# Patient Record
Sex: MALE | Race: WHITE
[De-identification: names, ages, dates, MRNs, and addresses within clinical notes are randomized per-mention and may not be internally consistent; named-entity substitution may affect disease eponyms.]

---

## 2017-01-11 PROBLEM — Z47.89 SURGICAL AFTERCARE, MUSCULOSKELETAL SYSTEM: Status: ACTIVE | Noted: 2017-01-11

## 2017-05-11 ENCOUNTER — PRIOR ORIGINAL RECORDS (OUTPATIENT)
Dept: OTHER | Age: 67
End: 2017-05-11

## 2017-05-16 PROBLEM — M23.91 INTERNAL DERANGEMENT OF KNEE, RIGHT: Status: ACTIVE | Noted: 2017-05-16

## 2017-05-16 PROBLEM — N40.1 BPH WITH OBSTRUCTION/LOWER URINARY TRACT SYMPTOMS: Status: ACTIVE | Noted: 2017-05-16

## 2017-05-16 PROBLEM — N13.8 BPH WITH OBSTRUCTION/LOWER URINARY TRACT SYMPTOMS: Status: ACTIVE | Noted: 2017-05-16

## 2017-06-06 ENCOUNTER — PRIOR ORIGINAL RECORDS (OUTPATIENT)
Dept: OTHER | Age: 67
End: 2017-06-06

## 2017-06-12 LAB
BUN: 17 MG/DL
CALCIUM: 9 MG/DL
CHLORIDE: 104 MEQ/L
CREATININE, SERUM: 1.21 MG/DL
GLUCOSE: 89 MG/DL
HEMATOCRIT: 47.5 %
HEMOGLOBIN: 15.7 G/DL
MCH: 28.6 PG
MCHC: 33.1 G/DL
MCV: 86.5 FL
PLATELETS: 201 K/UL
POTASSIUM, SERUM: 5.1 MEQ/L
RED BLOOD COUNT: 5.49 X 10-6/U
SODIUM: 139 MEQ/L
WHITE BLOOD COUNT: 9.2 X 10-3/U

## 2017-06-13 ENCOUNTER — PRIOR ORIGINAL RECORDS (OUTPATIENT)
Dept: OTHER | Age: 67
End: 2017-06-13

## 2017-06-15 ENCOUNTER — PRIOR ORIGINAL RECORDS (OUTPATIENT)
Dept: OTHER | Age: 67
End: 2017-06-15

## 2017-06-16 ENCOUNTER — HOSPITAL ENCOUNTER (OUTPATIENT)
Dept: NUCLEAR MEDICINE | Facility: HOSPITAL | Age: 67
Discharge: HOME | End: 2017-06-16
Attending: INTERNAL MEDICINE
Payer: COMMERCIAL

## 2017-06-16 ENCOUNTER — HOSPITAL ENCOUNTER (OUTPATIENT)
Dept: CV DIAGNOSTICS | Facility: HOSPITAL | Age: 67
Discharge: HOME | End: 2017-06-16
Attending: INTERNAL MEDICINE
Payer: COMMERCIAL

## 2017-06-16 DIAGNOSIS — I25.119 CORONARY ARTERY DISEASE WITH ANGINA PECTORIS, UNSPECIFIED VESSEL OR LESION TYPE, UNSPECIFIED WHETHER NATIVE OR TRANSPLANTED HEART (HCC): ICD-10-CM

## 2017-06-16 DIAGNOSIS — Z01.818 PRE-OP TESTING: ICD-10-CM

## 2017-06-16 PROCEDURE — 93018 CV STRESS TEST I&R ONLY: CPT | Performed by: INTERNAL MEDICINE

## 2017-06-16 PROCEDURE — 93017 CV STRESS TEST TRACING ONLY: CPT | Performed by: INTERNAL MEDICINE

## 2017-06-16 PROCEDURE — 93016 CV STRESS TEST SUPVJ ONLY: CPT | Performed by: INTERNAL MEDICINE

## 2017-06-16 PROCEDURE — 78452 HT MUSCLE IMAGE SPECT MULT: CPT | Performed by: INTERNAL MEDICINE

## 2017-06-16 RX ORDER — 0.9 % SODIUM CHLORIDE 0.9 %
VIAL (ML) INJECTION
Status: COMPLETED
Start: 2017-06-16 | End: 2017-06-16

## 2017-06-16 RX ADMIN — 0.9 % SODIUM CHLORIDE 10 ML: 0.9 % VIAL (ML) INJECTION at 11:55:00

## 2017-06-21 ENCOUNTER — PRIOR ORIGINAL RECORDS (OUTPATIENT)
Dept: OTHER | Age: 67
End: 2017-06-21

## 2017-06-21 ENCOUNTER — HOSPITAL ENCOUNTER (OUTPATIENT)
Dept: GENERAL RADIOLOGY | Facility: HOSPITAL | Age: 67
Discharge: HOME | End: 2017-06-21
Attending: INTERNAL MEDICINE
Payer: COMMERCIAL

## 2017-06-21 DIAGNOSIS — R94.39 ABNORMAL STRESS ECHO: ICD-10-CM

## 2017-06-21 DIAGNOSIS — Z01.810 PRE-OPERATIVE CARDIOVASCULAR EXAMINATION: ICD-10-CM

## 2017-06-21 PROCEDURE — 71020 XR CHEST PA + LAT CHEST (CPT=71020): CPT | Performed by: INTERNAL MEDICINE

## 2017-06-21 RX ORDER — SODIUM CHLORIDE 9 MG/ML
INJECTION, SOLUTION INTRAVENOUS ONCE
Status: COMPLETED | OUTPATIENT
Start: 2017-06-22 | End: 2017-06-22

## 2017-06-22 ENCOUNTER — HOSPITAL ENCOUNTER (OUTPATIENT)
Dept: INTERVENTIONAL RADIOLOGY/VASCULAR | Facility: HOSPITAL | Age: 67
Discharge: HOME | End: 2017-06-22
Attending: INTERNAL MEDICINE | Admitting: INTERNAL MEDICINE
Payer: COMMERCIAL

## 2017-06-22 ENCOUNTER — PRIOR ORIGINAL RECORDS (OUTPATIENT)
Dept: OTHER | Age: 67
End: 2017-06-22

## 2017-06-22 VITALS
HEIGHT: 69.5 IN | OXYGEN SATURATION: 97 % | BODY MASS INDEX: 26.06 KG/M2 | HEART RATE: 56 BPM | DIASTOLIC BLOOD PRESSURE: 68 MMHG | SYSTOLIC BLOOD PRESSURE: 129 MMHG | WEIGHT: 180 LBS | RESPIRATION RATE: 16 BRPM

## 2017-06-22 DIAGNOSIS — R94.39 ABNORMAL STRESS TEST: ICD-10-CM

## 2017-06-22 DIAGNOSIS — I25.10 CAD (CORONARY ARTERY DISEASE): ICD-10-CM

## 2017-06-22 PROCEDURE — B2151ZZ FLUOROSCOPY OF LEFT HEART USING LOW OSMOLAR CONTRAST: ICD-10-PCS | Performed by: INTERNAL MEDICINE

## 2017-06-22 PROCEDURE — 93459 L HRT ART/GRFT ANGIO: CPT

## 2017-06-22 PROCEDURE — 4A023N7 MEASUREMENT OF CARDIAC SAMPLING AND PRESSURE, LEFT HEART, PERCUTANEOUS APPROACH: ICD-10-PCS | Performed by: INTERNAL MEDICINE

## 2017-06-22 PROCEDURE — 99152 MOD SED SAME PHYS/QHP 5/>YRS: CPT

## 2017-06-22 PROCEDURE — 99153 MOD SED SAME PHYS/QHP EA: CPT

## 2017-06-22 PROCEDURE — B2131ZZ FLUOROSCOPY OF MULTIPLE CORONARY ARTERY BYPASS GRAFTS USING LOW OSMOLAR CONTRAST: ICD-10-PCS | Performed by: INTERNAL MEDICINE

## 2017-06-22 RX ORDER — SODIUM CHLORIDE 9 MG/ML
INJECTION, SOLUTION INTRAVENOUS
Status: COMPLETED
Start: 2017-06-22 | End: 2017-06-22

## 2017-06-22 RX ORDER — LIDOCAINE HYDROCHLORIDE 20 MG/ML
INJECTION, SOLUTION EPIDURAL; INFILTRATION; INTRACAUDAL; PERINEURAL
Status: COMPLETED
Start: 2017-06-22 | End: 2017-06-22

## 2017-06-22 RX ORDER — SODIUM CHLORIDE 9 MG/ML
INJECTION, SOLUTION INTRAVENOUS CONTINUOUS
Status: DISCONTINUED | OUTPATIENT
Start: 2017-06-22 | End: 2017-06-22

## 2017-06-22 RX ORDER — MIDAZOLAM HYDROCHLORIDE 1 MG/ML
INJECTION INTRAMUSCULAR; INTRAVENOUS
Status: COMPLETED
Start: 2017-06-22 | End: 2017-06-22

## 2017-06-22 RX ADMIN — SODIUM CHLORIDE: 9 INJECTION, SOLUTION INTRAVENOUS at 10:29:00

## 2017-06-22 NOTE — PROGRESS NOTES
[facility] HOSP - [facility]    Brief Cardiac Cath Note  Stephaniedanna Paulson Patient Status:  Outpatient    1950 MRN [id number]   Location [facility] Attending Alberta Francois MD   Hosp Day # 0 PCP Caitlin Haque,

## 2017-06-22 NOTE — PROCEDURES
[facility]    PATIENT'S NAME: Timi Juan José   ATTENDING PHYSICIAN: Sruthi Kelley. Amira Saldana MD   OPERATING PHYSICIAN: Gerald Zaman.  Thu Ward MD   PATIENT ACCOUNT#:   [id number]    LOCATION:  [address]  MEDICAL RECORD #:   [id number]       DA above with some degeneration in the ramus graft. The stent at the distal portion of the ramus graft into the ramus artery itself is widely patent. 2.   Normal left ventricular contractility.   3.   Normal left ventricular function with mildly elevated lef

## 2017-06-22 NOTE — PLAN OF CARE
Pt ambulated/voided/took liquids and food post procedure and tolerated well Site soft dry and intact after ambulation

## 2017-07-05 ENCOUNTER — PRIOR ORIGINAL RECORDS (OUTPATIENT)
Dept: OTHER | Age: 67
End: 2017-07-05

## 2017-07-05 ENCOUNTER — MYAURORA ACCOUNT LINK (OUTPATIENT)
Dept: OTHER | Age: 67
End: 2017-07-05

## 2017-08-11 PROCEDURE — 84153 ASSAY OF PSA TOTAL: CPT | Performed by: UROLOGY

## 2017-08-16 ENCOUNTER — PRIOR ORIGINAL RECORDS (OUTPATIENT)
Dept: OTHER | Age: 67
End: 2017-08-16

## 2017-08-21 PROBLEM — S83.241D TEAR OF MEDIAL MENISCUS OF RIGHT KNEE, UNSPECIFIED TEAR TYPE, UNSPECIFIED WHETHER OLD OR CURRENT TEAR, SUBSEQUENT ENCOUNTER: Status: ACTIVE | Noted: 2017-08-21

## 2017-11-16 ENCOUNTER — PRIOR ORIGINAL RECORDS (OUTPATIENT)
Dept: OTHER | Age: 67
End: 2017-11-16

## 2017-12-11 LAB
CHOLESTEROL, TOTAL: 165 MG/DL
HDL CHOLESTEROL: 43 MG/DL
LDL CHOLESTEROL: 90 MG/DL
TRIGLYCERIDES: 159 MG/DL

## 2017-12-12 ENCOUNTER — PRIOR ORIGINAL RECORDS (OUTPATIENT)
Dept: OTHER | Age: 67
End: 2017-12-12

## 2017-12-20 LAB
THYROID STIMULATING HORMONE: 2.03 MLU/L
URIC ACID: 7.7 MG/DL
VITAMIN D 25-OH: 23.93 NG/ML

## 2018-01-04 ENCOUNTER — LAB ENCOUNTER (OUTPATIENT)
Dept: LAB | Age: 68
End: 2018-01-04
Attending: INTERNAL MEDICINE
Payer: COMMERCIAL

## 2018-01-04 ENCOUNTER — PRIOR ORIGINAL RECORDS (OUTPATIENT)
Dept: OTHER | Age: 68
End: 2018-01-04

## 2018-01-04 DIAGNOSIS — E79.0 URICACIDEMIA: Primary | ICD-10-CM

## 2018-01-04 LAB
ALT SERPL-CCNC: 41 U/L (ref 17–63)
AST SERPL-CCNC: 21 U/L (ref 15–41)

## 2018-01-04 PROCEDURE — 84460 ALANINE AMINO (ALT) (SGPT): CPT

## 2018-01-04 PROCEDURE — 84450 TRANSFERASE (AST) (SGOT): CPT

## 2018-01-05 LAB
ALT (SGPT): 41 U/L
AST (SGOT): 21 U/L

## 2018-02-27 ENCOUNTER — LAB ENCOUNTER (OUTPATIENT)
Dept: LAB | Age: 68
End: 2018-02-27
Attending: FAMILY MEDICINE
Payer: COMMERCIAL

## 2018-02-27 DIAGNOSIS — I25.10 CORONARY ATHEROSCLEROSIS OF NATIVE CORONARY ARTERY: Primary | ICD-10-CM

## 2018-02-27 LAB
ALT SERPL-CCNC: 34 U/L (ref 17–63)
AST SERPL-CCNC: 22 U/L (ref 15–41)
CHOLEST SMN-MCNC: 126 MG/DL (ref ?–200)
HDLC SERPL-MCNC: 40 MG/DL (ref 45–?)
HDLC SERPL: 3.15 {RATIO} (ref ?–4.97)
LDLC SERPL CALC-MCNC: 62 MG/DL (ref ?–130)
NONHDLC SERPL-MCNC: 86 MG/DL (ref ?–130)
TRIGL SERPL-MCNC: 119 MG/DL (ref ?–150)
VLDLC SERPL CALC-MCNC: 24 MG/DL (ref 5–40)

## 2018-02-27 PROCEDURE — 84460 ALANINE AMINO (ALT) (SGPT): CPT

## 2018-02-27 PROCEDURE — 84450 TRANSFERASE (AST) (SGOT): CPT

## 2018-02-27 PROCEDURE — 80061 LIPID PANEL: CPT

## 2018-06-19 ENCOUNTER — PRIOR ORIGINAL RECORDS (OUTPATIENT)
Dept: OTHER | Age: 68
End: 2018-06-19

## 2018-06-19 ENCOUNTER — MYAURORA ACCOUNT LINK (OUTPATIENT)
Dept: OTHER | Age: 68
End: 2018-06-19

## 2018-06-20 ENCOUNTER — PRIOR ORIGINAL RECORDS (OUTPATIENT)
Dept: OTHER | Age: 68
End: 2018-06-20

## 2018-06-27 ENCOUNTER — PRIOR ORIGINAL RECORDS (OUTPATIENT)
Dept: OTHER | Age: 68
End: 2018-06-27

## 2018-06-27 ENCOUNTER — LAB ENCOUNTER (OUTPATIENT)
Dept: LAB | Age: 68
End: 2018-06-27
Attending: INTERNAL MEDICINE
Payer: COMMERCIAL

## 2018-06-27 DIAGNOSIS — E78.00 PURE HYPERCHOLESTEROLEMIA: Primary | ICD-10-CM

## 2018-06-27 PROCEDURE — 84460 ALANINE AMINO (ALT) (SGPT): CPT

## 2018-06-27 PROCEDURE — 84450 TRANSFERASE (AST) (SGOT): CPT

## 2018-06-27 PROCEDURE — 80061 LIPID PANEL: CPT

## 2018-06-28 LAB
ALT (SGPT): 34 U/L
AST (SGOT): 18 U/L
CHOLESTEROL, TOTAL: 118 MG/DL
HDL CHOLESTEROL: 39 MG/DL
LDL CHOLESTEROL: 59 MG/DL
TRIGLYCERIDES: 118 MG/DL

## 2018-07-03 ENCOUNTER — MYAURORA ACCOUNT LINK (OUTPATIENT)
Dept: OTHER | Age: 68
End: 2018-07-03

## 2018-07-03 ENCOUNTER — PRIOR ORIGINAL RECORDS (OUTPATIENT)
Dept: OTHER | Age: 68
End: 2018-07-03

## 2018-08-16 PROCEDURE — 36415 COLL VENOUS BLD VENIPUNCTURE: CPT | Performed by: UROLOGY

## 2018-08-16 PROCEDURE — 84153 ASSAY OF PSA TOTAL: CPT | Performed by: UROLOGY

## 2018-08-16 PROCEDURE — 84154 ASSAY OF PSA FREE: CPT | Performed by: UROLOGY

## 2018-10-09 PROCEDURE — 86038 ANTINUCLEAR ANTIBODIES: CPT | Performed by: FAMILY MEDICINE

## 2018-10-09 PROCEDURE — 86200 CCP ANTIBODY: CPT | Performed by: FAMILY MEDICINE

## 2018-11-14 PROBLEM — Z79.02 PLATELET INHIBITION DUE TO PLAVIX: Status: ACTIVE | Noted: 2018-11-14

## 2018-11-21 PROBLEM — Z86.010 HISTORY OF ADENOMATOUS POLYP OF COLON: Status: ACTIVE | Noted: 2018-11-21

## 2018-11-21 PROBLEM — Z86.0101 HISTORY OF ADENOMATOUS POLYP OF COLON: Status: ACTIVE | Noted: 2018-11-21

## 2018-11-21 PROCEDURE — 88305 TISSUE EXAM BY PATHOLOGIST: CPT | Performed by: INTERNAL MEDICINE

## 2019-02-28 VITALS
BODY MASS INDEX: 27.7 KG/M2 | HEIGHT: 69 IN | RESPIRATION RATE: 18 BRPM | DIASTOLIC BLOOD PRESSURE: 70 MMHG | SYSTOLIC BLOOD PRESSURE: 120 MMHG | HEART RATE: 60 BPM | WEIGHT: 187 LBS

## 2019-02-28 VITALS
RESPIRATION RATE: 10 BRPM | SYSTOLIC BLOOD PRESSURE: 132 MMHG | DIASTOLIC BLOOD PRESSURE: 66 MMHG | HEART RATE: 57 BPM | OXYGEN SATURATION: 98 % | HEIGHT: 69 IN | BODY MASS INDEX: 26.96 KG/M2 | WEIGHT: 182 LBS

## 2019-02-28 VITALS
OXYGEN SATURATION: 97 % | SYSTOLIC BLOOD PRESSURE: 128 MMHG | WEIGHT: 181 LBS | HEART RATE: 49 BPM | BODY MASS INDEX: 26.81 KG/M2 | HEIGHT: 69 IN | DIASTOLIC BLOOD PRESSURE: 82 MMHG | RESPIRATION RATE: 20 BRPM

## 2019-02-28 VITALS
SYSTOLIC BLOOD PRESSURE: 122 MMHG | HEART RATE: 54 BPM | WEIGHT: 176 LBS | RESPIRATION RATE: 20 BRPM | DIASTOLIC BLOOD PRESSURE: 72 MMHG | HEIGHT: 69 IN | OXYGEN SATURATION: 97 % | BODY MASS INDEX: 26.07 KG/M2

## 2019-03-01 VITALS
SYSTOLIC BLOOD PRESSURE: 138 MMHG | HEART RATE: 61 BPM | WEIGHT: 182 LBS | BODY MASS INDEX: 26.96 KG/M2 | DIASTOLIC BLOOD PRESSURE: 68 MMHG | HEIGHT: 69 IN | RESPIRATION RATE: 18 BRPM

## 2019-03-12 RX ORDER — LORATADINE 10 MG/1
TABLET ORAL
COMMUNITY

## 2019-03-12 RX ORDER — CLOPIDOGREL BISULFATE 75 MG/1
1 TABLET ORAL DAILY
COMMUNITY
Start: 2018-10-04 | End: 2019-04-04 | Stop reason: SDUPTHER

## 2019-03-12 RX ORDER — ESCITALOPRAM OXALATE 20 MG/1
TABLET ORAL DAILY
COMMUNITY
End: 2020-09-30

## 2019-03-12 RX ORDER — ATORVASTATIN CALCIUM 40 MG/1
TABLET, FILM COATED ORAL
COMMUNITY
Start: 2018-08-16 | End: 2019-04-04 | Stop reason: SDUPTHER

## 2019-03-12 RX ORDER — RAMIPRIL 10 MG/1
CAPSULE ORAL
COMMUNITY

## 2019-03-12 RX ORDER — METOPROLOL SUCCINATE 25 MG/1
6.25 TABLET, EXTENDED RELEASE ORAL
COMMUNITY
Start: 2018-06-20 | End: 2021-05-04 | Stop reason: SDUPTHER

## 2019-03-12 RX ORDER — PHENOL 1.4 %
1 AEROSOL, SPRAY (ML) MUCOUS MEMBRANE DAILY
COMMUNITY
Start: 2016-12-06

## 2019-03-25 ENCOUNTER — OFFICE VISIT (OUTPATIENT)
Dept: CARDIOLOGY | Age: 69
End: 2019-03-25

## 2019-03-25 ENCOUNTER — APPOINTMENT (OUTPATIENT)
Dept: CARDIOLOGY | Age: 69
End: 2019-03-25

## 2019-03-25 VITALS
DIASTOLIC BLOOD PRESSURE: 60 MMHG | WEIGHT: 184 LBS | HEART RATE: 62 BPM | SYSTOLIC BLOOD PRESSURE: 126 MMHG | BODY MASS INDEX: 27.25 KG/M2 | HEIGHT: 69 IN

## 2019-03-25 DIAGNOSIS — E78.5 HYPERLIPIDEMIA, UNSPECIFIED HYPERLIPIDEMIA TYPE: ICD-10-CM

## 2019-03-25 DIAGNOSIS — R00.1 BRADYCARDIA: ICD-10-CM

## 2019-03-25 DIAGNOSIS — I25.10 CORONARY ARTERY DISEASE WITHOUT ANGINA PECTORIS, UNSPECIFIED VESSEL OR LESION TYPE, UNSPECIFIED WHETHER NATIVE OR TRANSPLANTED HEART: Primary | ICD-10-CM

## 2019-03-25 DIAGNOSIS — I10 HYPERTENSION, UNSPECIFIED TYPE: ICD-10-CM

## 2019-03-25 PROCEDURE — 99214 OFFICE O/P EST MOD 30 MIN: CPT | Performed by: INTERNAL MEDICINE

## 2019-03-25 PROCEDURE — 3078F DIAST BP <80 MM HG: CPT | Performed by: INTERNAL MEDICINE

## 2019-03-25 PROCEDURE — 3074F SYST BP LT 130 MM HG: CPT | Performed by: INTERNAL MEDICINE

## 2019-04-04 RX ORDER — ATORVASTATIN CALCIUM 40 MG/1
TABLET, FILM COATED ORAL
Qty: 90 TABLET | Refills: 2 | Status: SHIPPED | OUTPATIENT
Start: 2019-04-04 | End: 2020-01-08 | Stop reason: ALTCHOICE

## 2019-04-04 RX ORDER — CLOPIDOGREL BISULFATE 75 MG/1
TABLET ORAL
Qty: 90 TABLET | Refills: 2 | Status: SHIPPED | OUTPATIENT
Start: 2019-04-04 | End: 2020-01-06 | Stop reason: SDUPTHER

## 2019-04-04 RX ORDER — METOPROLOL SUCCINATE 25 MG/1
TABLET, EXTENDED RELEASE ORAL
Status: CANCELLED | OUTPATIENT
Start: 2019-04-04

## 2019-10-13 PROBLEM — R39.15 URGENCY OF MICTURITION: Status: ACTIVE | Noted: 2019-10-13

## 2019-10-13 PROBLEM — R35.1 NOCTURIA: Status: ACTIVE | Noted: 2019-10-13

## 2019-10-24 RX ORDER — ATORVASTATIN CALCIUM 40 MG/1
TABLET, FILM COATED ORAL
Qty: 90 TABLET | Refills: 2 | OUTPATIENT
Start: 2019-10-24

## 2019-10-24 RX ORDER — CLOPIDOGREL BISULFATE 75 MG/1
TABLET ORAL
Qty: 90 TABLET | Refills: 2 | OUTPATIENT
Start: 2019-10-24

## 2019-11-01 ENCOUNTER — TELEPHONE (OUTPATIENT)
Dept: CARDIOLOGY | Age: 69
End: 2019-11-01

## 2019-11-01 RX ORDER — ATORVASTATIN CALCIUM 40 MG/1
TABLET, FILM COATED ORAL
Qty: 90 TABLET | Refills: 2 | Status: CANCELLED | OUTPATIENT
Start: 2019-11-01

## 2019-11-01 RX ORDER — CLOPIDOGREL BISULFATE 75 MG/1
TABLET ORAL
Qty: 90 TABLET | Refills: 2 | Status: CANCELLED | OUTPATIENT
Start: 2019-11-01

## 2019-11-01 RX ORDER — CLOPIDOGREL BISULFATE 75 MG/1
75 TABLET ORAL DAILY
Qty: 90 TABLET | Refills: 0 | Status: SHIPPED | OUTPATIENT
Start: 2019-11-01 | End: 2020-02-24 | Stop reason: SDUPTHER

## 2019-11-12 RX ORDER — ATORVASTATIN CALCIUM 40 MG/1
TABLET, FILM COATED ORAL
Qty: 90 TABLET | Refills: 2 | OUTPATIENT
Start: 2019-11-12

## 2019-11-26 LAB
ABSOLUTE IMMATURE GRANULOCYTES (OFFPRE24): NORMAL
ALT SERPL-CCNC: 38 U/L
ANION GAP SERPL CALC-SCNC: NORMAL MMOL/L
BASO+EOS+MONOS # BLD: NORMAL 10*3/UL
BASO+EOS+MONOS NFR BLD: NORMAL %
BASOPHILS # BLD: NORMAL 10*3/UL
BASOPHILS NFR BLD: NORMAL %
BUN SERPL-MCNC: 15 MG/DL
BUN/CREAT SERPL: NORMAL
CALCIUM SERPL-MCNC: 8.3 MG/DL
CHLORIDE SERPL-SCNC: 107 MMOL/L
CHOLEST SERPL-MCNC: 128 MG/DL
CHOLEST/HDLC SERPL: NORMAL {RATIO}
CO2 SERPL-SCNC: 26.1 MMOL/L
CREAT SERPL-MCNC: 0.94 MG/DL
DIFFERENTIAL METHOD BLD: NORMAL
EOSINOPHIL # BLD: NORMAL 10*3/UL
EOSINOPHIL NFR BLD: NORMAL %
ERYTHROCYTE [DISTWIDTH] IN BLOOD: NORMAL %
GLUCOSE SERPL-MCNC: 93 MG/DL
HCT VFR BLD CALC: 45.1 %
HDLC SERPL-MCNC: 40 MG/DL
HGB BLD-MCNC: 14.7 G/DL
IMMATURE GRANULOCYTES (OFFPRE25): NORMAL
LDLC SERPL CALC-MCNC: 69 MG/DL
LENGTH OF FAST TIME PATIENT: NORMAL H
LENGTH OF FAST TIME PATIENT: NORMAL H
LYMPHOCYTES # BLD: NORMAL 10*3/UL
LYMPHOCYTES NFR BLD: NORMAL %
MCH RBC QN AUTO: NORMAL PG
MCHC RBC AUTO-ENTMCNC: NORMAL G/DL
MCV RBC AUTO: NORMAL FL
MONOCYTES # BLD: NORMAL 10*3/UL
MONOCYTES NFR BLD: NORMAL %
MPV (OFFPRE2): NORMAL
NEUTROPHILS # BLD: NORMAL 10*3/UL
NEUTROPHILS NFR BLD: NORMAL %
NONHDLC SERPL-MCNC: NORMAL MG/DL
NRBC BLD MANUAL-RTO: NORMAL %
PLAT MORPH BLD: NORMAL
PLATELET # BLD: 206 10*3/UL
POTASSIUM SERPL-SCNC: 4.2 MMOL/L
RBC # BLD: 5.19 10*6/UL
RBC MORPH BLD: NORMAL
SODIUM SERPL-SCNC: 140 MMOL/L
TRIGL SERPL-MCNC: 96 MG/DL
TSH SERPL-ACNC: 1.69 M[IU]/L
VLDLC SERPL CALC-MCNC: NORMAL MG/DL
WBC # BLD: 7.94 10*3/UL
WBC MORPH BLD: NORMAL

## 2020-01-01 ENCOUNTER — EXTERNAL RECORD (OUTPATIENT)
Dept: HEALTH INFORMATION MANAGEMENT | Facility: OTHER | Age: 70
End: 2020-01-01

## 2020-01-06 RX ORDER — TAMSULOSIN HYDROCHLORIDE 0.4 MG/1
CAPSULE ORAL
COMMUNITY
Start: 2019-12-13

## 2020-01-06 RX ORDER — VALACYCLOVIR HYDROCHLORIDE 1 G/1
TABLET, FILM COATED ORAL
COMMUNITY
Start: 2019-12-18

## 2020-01-08 ENCOUNTER — OFFICE VISIT (OUTPATIENT)
Dept: CARDIOLOGY | Age: 70
End: 2020-01-08

## 2020-01-08 VITALS
OXYGEN SATURATION: 99 % | SYSTOLIC BLOOD PRESSURE: 142 MMHG | WEIGHT: 180 LBS | BODY MASS INDEX: 26.66 KG/M2 | HEART RATE: 56 BPM | RESPIRATION RATE: 18 BRPM | HEIGHT: 69 IN | DIASTOLIC BLOOD PRESSURE: 80 MMHG

## 2020-01-08 DIAGNOSIS — I25.10 CORONARY ARTERY DISEASE WITHOUT ANGINA PECTORIS, UNSPECIFIED VESSEL OR LESION TYPE, UNSPECIFIED WHETHER NATIVE OR TRANSPLANTED HEART: Primary | ICD-10-CM

## 2020-01-08 DIAGNOSIS — E78.00 PURE HYPERCHOLESTEROLEMIA: ICD-10-CM

## 2020-01-08 PROCEDURE — 3079F DIAST BP 80-89 MM HG: CPT | Performed by: INTERNAL MEDICINE

## 2020-01-08 PROCEDURE — 99214 OFFICE O/P EST MOD 30 MIN: CPT | Performed by: INTERNAL MEDICINE

## 2020-01-08 PROCEDURE — 3077F SYST BP >= 140 MM HG: CPT | Performed by: INTERNAL MEDICINE

## 2020-01-08 RX ORDER — ROSUVASTATIN CALCIUM 20 MG/1
20 TABLET, COATED ORAL DAILY
Qty: 90 TABLET | Refills: 3 | Status: SHIPPED | OUTPATIENT
Start: 2020-01-08 | End: 2020-06-17 | Stop reason: ALTCHOICE

## 2020-01-08 ASSESSMENT — PATIENT HEALTH QUESTIONNAIRE - PHQ9
1. LITTLE INTEREST OR PLEASURE IN DOING THINGS: NOT AT ALL
SUM OF ALL RESPONSES TO PHQ9 QUESTIONS 1 AND 2: 0
SUM OF ALL RESPONSES TO PHQ9 QUESTIONS 1 AND 2: 0
2. FEELING DOWN, DEPRESSED OR HOPELESS: NOT AT ALL

## 2020-01-14 ENCOUNTER — ANCILLARY PROCEDURE (OUTPATIENT)
Dept: CARDIOLOGY | Age: 70
End: 2020-01-14
Attending: INTERNAL MEDICINE

## 2020-01-14 VITALS — HEIGHT: 69 IN | BODY MASS INDEX: 26.66 KG/M2 | WEIGHT: 180 LBS

## 2020-01-14 DIAGNOSIS — R06.02 SHORTNESS OF BREATH: Primary | ICD-10-CM

## 2020-01-14 DIAGNOSIS — I25.10 CORONARY ARTERY DISEASE WITHOUT ANGINA PECTORIS, UNSPECIFIED VESSEL OR LESION TYPE, UNSPECIFIED WHETHER NATIVE OR TRANSPLANTED HEART: ICD-10-CM

## 2020-01-14 LAB
HEART RATE RESERVE PREDICTED: 47.02 BPM
LV EF: 66 %
PEAK HR ACHIEVED: 80 BPM
RESTING HR ACHIEVED: 48 BPM
STRESS BASELINE BP: NORMAL MMHG
STRESS PERCENT HR: 53 %
STRESS POST EXERCISE DUR MIN: 4 MIN
STRESS POST EXERCISE DUR SEC: 0 SEC
STRESS POST PEAK BP: NORMAL MMHG
STRESS TARGET HR: 151 BPM

## 2020-01-14 PROCEDURE — 78452 HT MUSCLE IMAGE SPECT MULT: CPT | Performed by: INTERNAL MEDICINE

## 2020-01-14 PROCEDURE — A9502 TC99M TETROFOSMIN: HCPCS

## 2020-01-14 PROCEDURE — 93015 CV STRESS TEST SUPVJ I&R: CPT | Performed by: INTERNAL MEDICINE

## 2020-01-14 RX ORDER — REGADENOSON 0.08 MG/ML
0.4 INJECTION, SOLUTION INTRAVENOUS ONCE
Status: COMPLETED | OUTPATIENT
Start: 2020-01-14 | End: 2020-01-14

## 2020-01-14 RX ADMIN — REGADENOSON 0.4 MG: 0.08 INJECTION, SOLUTION INTRAVENOUS at 09:10

## 2020-01-14 ASSESSMENT — EXERCISE STRESS TEST
GRADE: 0
MPH: 1.0
STRESS_SYMPTOMS: LIGHTHEADEDNESS
PEAK_BP: 126/74
GRADE: 0
PEAK_RPP: 6720
STAGE_CATEGORIES: RECOVERY 2
PEAK_HR: 60
STAGE_CATEGORIES: RECOVERY 1
PEAK_BP: 142/82
STAGE_CATEGORIES: 2
PEAK_BP: 122/70
PEAK_HR: 80
PEAK_HR: 48
MPH: 1.0
STAGE_CATEGORIES: RECOVERY 0
PEAK_BP: 128/70
PEAK_BP: 140/82
PEAK_BP: 136/84
COMMENTS: 4 MINUTE RECOVERY
PEAK_HR: 58
STAGE_CATEGORIES: 1
PEAK_RPP: 8236
PEAK_RPP: 9600
PEAK_HR: 75
PEAK_HR: 61
PEAK_RPP: 7442
PEAK_RPP: 10080
GRADE: 0
STAGE_CATEGORIES: RESTING
MPH: 1.0
PEAK_RPP: 8160
STOPPAGE_REASON: PROTOCOL COMPLETE

## 2020-01-15 ENCOUNTER — TELEPHONE (OUTPATIENT)
Dept: CARDIOLOGY | Age: 70
End: 2020-01-15

## 2020-01-16 ENCOUNTER — TELEPHONE (OUTPATIENT)
Dept: CARDIOLOGY | Age: 70
End: 2020-01-16

## 2020-02-24 RX ORDER — CLOPIDOGREL BISULFATE 75 MG/1
TABLET ORAL
Qty: 90 TABLET | Refills: 1 | Status: SHIPPED | OUTPATIENT
Start: 2020-02-24 | End: 2020-06-05

## 2020-06-05 RX ORDER — CLOPIDOGREL BISULFATE 75 MG/1
TABLET ORAL
Qty: 90 TABLET | Refills: 0 | Status: SHIPPED | OUTPATIENT
Start: 2020-06-05 | End: 2021-05-04 | Stop reason: SDUPTHER

## 2020-06-12 LAB
ALT SERPL-CCNC: 20 U/L
AST SERPL-CCNC: 19 U/L
CHOLEST SERPL-MCNC: 158 MG/DL
HDLC SERPL-MCNC: 38 MG/DL
LDLC SERPL CALC-MCNC: 82 MG/DL
TRIGL SERPL-MCNC: 191 MG/DL

## 2020-06-15 ENCOUNTER — CLINICAL ABSTRACT (OUTPATIENT)
Dept: CARDIOLOGY | Age: 70
End: 2020-06-15

## 2020-06-16 ENCOUNTER — CLINICAL ABSTRACT (OUTPATIENT)
Dept: CARDIOLOGY | Age: 70
End: 2020-06-16

## 2020-06-16 ENCOUNTER — TELEPHONE (OUTPATIENT)
Dept: CARDIOLOGY | Age: 70
End: 2020-06-16

## 2020-06-16 DIAGNOSIS — E78.5 HYPERLIPIDEMIA, UNSPECIFIED HYPERLIPIDEMIA TYPE: Primary | ICD-10-CM

## 2020-06-17 RX ORDER — ATORVASTATIN CALCIUM 40 MG/1
TABLET, FILM COATED ORAL
Status: SHIPPED | COMMUNITY
Start: 2020-06-17 | End: 2020-07-23 | Stop reason: SDUPTHER

## 2020-07-23 ENCOUNTER — TELEPHONE (OUTPATIENT)
Dept: CARDIOLOGY | Age: 70
End: 2020-07-23

## 2020-07-23 RX ORDER — ATORVASTATIN CALCIUM 40 MG/1
TABLET, FILM COATED ORAL
Qty: 135 TABLET | Refills: 0 | Status: SHIPPED | OUTPATIENT
Start: 2020-07-23 | End: 2020-10-05

## 2020-08-17 ENCOUNTER — APPOINTMENT (OUTPATIENT)
Dept: CARDIOLOGY | Age: 70
End: 2020-08-17

## 2020-09-30 ENCOUNTER — OFFICE VISIT (OUTPATIENT)
Dept: CARDIOLOGY | Age: 70
End: 2020-09-30

## 2020-09-30 VITALS
WEIGHT: 184 LBS | RESPIRATION RATE: 18 BRPM | BODY MASS INDEX: 26.34 KG/M2 | OXYGEN SATURATION: 99 % | SYSTOLIC BLOOD PRESSURE: 122 MMHG | HEIGHT: 70 IN | HEART RATE: 57 BPM | DIASTOLIC BLOOD PRESSURE: 80 MMHG

## 2020-09-30 DIAGNOSIS — E78.5 HYPERLIPIDEMIA, UNSPECIFIED HYPERLIPIDEMIA TYPE: ICD-10-CM

## 2020-09-30 DIAGNOSIS — I25.10 CORONARY ARTERY DISEASE WITHOUT ANGINA PECTORIS, UNSPECIFIED VESSEL OR LESION TYPE, UNSPECIFIED WHETHER NATIVE OR TRANSPLANTED HEART: Primary | ICD-10-CM

## 2020-09-30 PROCEDURE — 3074F SYST BP LT 130 MM HG: CPT | Performed by: INTERNAL MEDICINE

## 2020-09-30 PROCEDURE — 99214 OFFICE O/P EST MOD 30 MIN: CPT | Performed by: INTERNAL MEDICINE

## 2020-09-30 PROCEDURE — 3079F DIAST BP 80-89 MM HG: CPT | Performed by: INTERNAL MEDICINE

## 2020-09-30 RX ORDER — OXYBUTYNIN CHLORIDE 5 MG/1
5 TABLET, EXTENDED RELEASE ORAL DAILY
COMMUNITY

## 2020-09-30 ASSESSMENT — PATIENT HEALTH QUESTIONNAIRE - PHQ9
SUM OF ALL RESPONSES TO PHQ9 QUESTIONS 1 AND 2: 0
2. FEELING DOWN, DEPRESSED OR HOPELESS: NOT AT ALL
CLINICAL INTERPRETATION OF PHQ2 SCORE: NO FURTHER SCREENING NEEDED
1. LITTLE INTEREST OR PLEASURE IN DOING THINGS: NOT AT ALL
SUM OF ALL RESPONSES TO PHQ9 QUESTIONS 1 AND 2: 0
CLINICAL INTERPRETATION OF PHQ9 SCORE: NO FURTHER SCREENING NEEDED

## 2020-10-05 RX ORDER — ATORVASTATIN CALCIUM 40 MG/1
TABLET, FILM COATED ORAL
Qty: 135 TABLET | Refills: 2 | Status: SHIPPED | OUTPATIENT
Start: 2020-10-05 | End: 2021-05-04 | Stop reason: SDUPTHER

## 2020-11-13 LAB
ALT SERPL-CCNC: 22 UNITS/L
CHOLEST SERPL-MCNC: 100 MG/DL
CHOLEST/HDLC SERPL: NORMAL {RATIO}
HDLC SERPL-MCNC: 33 MG/DL
LDLC SERPL CALC-MCNC: 51 MG/DL
LENGTH OF FAST TIME PATIENT: NORMAL H
NONHDLC SERPL-MCNC: NORMAL MG/DL
TRIGL SERPL-MCNC: 80 MG/DL
VLDLC SERPL CALC-MCNC: NORMAL MG/DL

## 2020-12-01 ENCOUNTER — E-ADVICE (OUTPATIENT)
Dept: CARDIOLOGY | Age: 70
End: 2020-12-01

## 2020-12-02 ENCOUNTER — E-ADVICE (OUTPATIENT)
Dept: CARDIOLOGY | Age: 70
End: 2020-12-02

## 2020-12-02 ENCOUNTER — CLINICAL ABSTRACT (OUTPATIENT)
Dept: CARDIOLOGY | Age: 70
End: 2020-12-02

## 2020-12-02 ENCOUNTER — TELEPHONE (OUTPATIENT)
Dept: CARDIOLOGY | Age: 70
End: 2020-12-02

## 2021-05-04 ENCOUNTER — TELEPHONE (OUTPATIENT)
Dept: CARDIOLOGY | Age: 71
End: 2021-05-04

## 2021-05-04 RX ORDER — CLOPIDOGREL BISULFATE 75 MG/1
75 TABLET ORAL DAILY
Qty: 90 TABLET | Refills: 2 | Status: SHIPPED | OUTPATIENT
Start: 2021-05-04

## 2021-05-04 RX ORDER — METOPROLOL SUCCINATE 25 MG/1
25 TABLET, EXTENDED RELEASE ORAL DAILY
Qty: 90 TABLET | Refills: 2 | Status: SHIPPED | OUTPATIENT
Start: 2021-05-04

## 2021-05-04 RX ORDER — ATORVASTATIN CALCIUM 40 MG/1
TABLET, FILM COATED ORAL
Qty: 135 TABLET | Refills: 2 | Status: SHIPPED | OUTPATIENT
Start: 2021-05-04

## 2024-03-22 NOTE — DISCHARGE INSTRUCTIONS
UROLOGY POST OPERATIVE INSTRUCTIONS    Resume your Plavix on Sunday  Ok to resume aspirin tomorrow      The time after surgery is one that can be interesting, scary, and full of questions.  Here are some general items to help you navigate the post-operative period.  At any time, should you have any questions, don't hesitate to contact our office for additional assistance.    PAIN: Please alternate 1000mg Tyelonol with 400-600mg Ibuprofen (Advil, Motrin) every 3 hours.  This will allow you to limit your narcotic use.      DIET:  Unless otherwise directed, resume your regular healthy diet.  Return to any medically-directed diets if necessary (renal diet, diabetic diet, etc.).  Drink plenty of fluids.  Most fluids are all right, including small amounts of alcoholic beverages if desired (but not recommended if you are taking prescription pain medication or other medications that you may not use with alcohol ingestion.)    Limit coffee, teas, and sodas    Ok to have multiple small meals daily    ACTIVITY:      Take 4, 20 minute walks daily      Avoid strenuous physical exercise, including heavy lifting/pushing/pulling (>20 lbs.) and sexual intercourse until released by your doctor.  This is generally a period of 2-4 weeks for open or laparoscopic surgical procedures.  Driving is generally okay once pain free and off all prescription pain medications; of course, you may be a passenger for short rides.   Extended travel is not recommended until you are released by your surgeon.  It is okay to go up and down stairs as long as you go slowly.  If you have any surgical clips or sutures, you may be allowed to take routine showers, but should not submerge your incisions in standing water (pool, tub, etc.) for 21 days.  Avoid rubbing or scrubbing the incision(s).  Rather, allow soapy water to pass over the incisions and simply pat them dry.    VOIDING:  Many urology patients will be discharged with a catheter and will need  additional instructions (see below); however, for patients without a catheter, please void whenever the urge presents itself.  Do not hold your urine.  You may be getting up in the middle of the night or urinating more frequently during the daytime after any urologic procedure.  This is normal after many types of surgeries, but a more normal urinary voiding pattern should resume within days, or rarely within a few weeks.  If you are unable to urinate altogether, please phone our nurse for further instructions, or seek attention in an immediate/urgent/or emergent setting.      BLOOD IN THE URINE:  You may have some blood in the urine for two weeks after some urologic procedures.  This is usually not serious and a normal part of healing from some urinary surgeries.  Should you have persistent blood, large clots, or the inability to void due to large clots, notify you urology team.    REST:  You should get plenty of rest after any procedure.  However, use your bed as you did prior to your surgery - to take a small nap, and to sleep in the evenings.  Do not lie around in bed all day as this can actually result in post anesthesia complications.  We encourage you to get out of bed daily, take multiple light walks, strolling outdoors if desired.  It is well known that patients that lie or sit all day have more wound complications, at times sever complications from blood clots in the legs, pneumonias, and other challenges.    CONSTIPATION:  Please work to avoid constipation.  We do not recommend enemas or most rectal suppositories after most urologic surgery.  Daily use of Colace or Docusate over-the-counter (100 mg three times daily with 8 oz water) is recommended for the month after surgery - especially if taking prescription pain medication.  Daily prune juice and increased intake of fruits and vegetables are also helpful to prevent acute constipation.  Acute constipation may necessitate the use of over-the-counter Milk  of Magnesia - 30 cc every evening until effect - if needed.    MEDICATIONS:  Unless otherwise directed, resume all of your prior home medications upon discharge.  The exception may be blood thinners (Coumadin, Warfarin, Plavix, Xeralto, etc.) which are typically held for one week prior to, and after, larger urologic surgeries.  Your surgeon will give the recommended times for these medications to be held so that you may work with the nurse or physician tem that manages your anticoagulation medication.  Should you be prescribed and antibiotic please take as directed until completed.    CATHETERS:  You may be discharged with a urethral catheter (tube placed into your urethra - into your bladder - to allow free passage of urine to a collection device.        As previously noted, you may notice blood in the urine - especially with more activity.  Until cleared by your surgeon to do so, avoid rigorous activities with either a stent or catheter to prevent worsening discomfort or blood in the urine.      Increase fluid intake to 2.5-3 liters per day, 50% in water.  Catheter management per urology team, if needed.  You may notice more urinary urgency and frequency, along with blood in the urine.    FOLLOW UP CARE:  Please call our office at [phone] to coordinate follow up    NOTIFY OUR OFFICE OF:  - Temperature greater than 101 degrees.  - Accitentally pulling the string and your stent is pulled out.  - Any questions you think are important for your urology team.  - You have challenges with catheter management.    SEEK EMERGENCY CARE with sudden onset of shortness of breath, chest pain, increasing calf or leg pain, or acute condition that you feel needs emergent attention.        CARE OF YOUR URINE CATHETER ( ALSO CALLED A BERNSTEIN CATHETER)  You have been discharged with an indwelling urinary catheter ( Bernstein catheter). A catheter is a thin, flexible tube. An indwelling urinary catheter has two parts. The first part is  a tube that drains urine from your bladder. The second part is a bag or other device that collects the urine.   The most important thing to remember is that you want to prevent infection. Always wash your hands before handling your catheter bag or tubing.   Home Care Basics:  Empty the urine bag when it is full  Do not detach tubing from the bag, unless you are changing the bag  Do not pull on the catheter or remove it, unless you have been instructed by your doctor  to do so.  Draining The Bedside Bag   Wash your hands with soap and clean, running water or an alcohol-based hand  that contains at least 60% alcohol.   Hold the drainage tube over a toilet or measuring container.   Unclamp the tube and let the bag drain.   Don’t touch the tip of the drainage tube or let it touch the toilet or container.  Emptying a Leg Bag   Wash your hands.   Remove the stopper on the bag.   Drain the bag into the toilet or a measuring container. Don’t let the tip of the drainage tube touch anything, including your fingers.   Clean the tip of the drainage tube with alcohol.   Replace the stopper.  Cleaning The Drainage Tube   When the bag is empty, clean the tip of the drainage tube with an alcohol wipe.   Clamp the tube.   Reinsert the tube into the pocket on the drainage bag.  Cleaning Your Skin and Tubing   Clean the skin near the catheter with soap and water.   Wash your genital area from front to back.   Wash the catheter tubing. Always wash the catheter in the direction away from your body.   You will be told when and how to change your bag and tubing.   Don’t try to remove the catheter by yourself, unless instructed by your doctor.  You may shower with the catheter in place.     HOME INSTRUCTIONS  AMBSURG HOME CARE INSTRUCTIONS: POST-OP ANESTHESIA  The medication that you received for sedation or general anesthesia can last up to 24 hours. Your judgment and reflexes may be altered, even if you feel like your normal  self.      We Recommend:   Do not drive any motor vehicle or bicycle   Avoid mowing the lawn, playing sports, or working with power tools/applicances (power saws, electric knives or mixers)   That you have someone stay with you on your first night home   Do not drink alcohol or take sleeping pills or tranquilizers   Do not sign legal documents within 24 hours of your procedure   If you had a nerve block for your surgery, take extra care not to put any pressure on your arm or hand for 24 hours    It is normal:  For you to have a sore throat if you had a breathing tube during surgery (while you were asleep!). The sore throat should get better within 48 hours. You can gargle with warm salt water (1/2 tsp in 4 oz warm water) or use a throat lozenge for comfort  To feel muscle aches or soreness especially in the abdomen, chest or neck. The achy feeling should go away in the next 24 hours  To feel weak, sleepy or \"wiped out\". Your should start feeling better in the next 24 hours.   To experience mild discomforts such as sore lip or tongue, headache, cramps, gas pains or a bloated feeling in your abdomen.   To experience mild back pain or soreness for a day or two if you had spinal or epidural anesthesia.   If you had laparoscopic surgery, to feel shoulder pain or discomfort on the day of surgery.   For some patients to have nausea after surgery/anesthesia    If you feel nausea or experience vomiting:   Try to move around less.   Eat less than usual or drink only liquids until the next morning   Nausea should resolve in about 24 hours    If you have a problem when you are at home:    Call your surgeons office   Discharge Instructions: After Your Surgery  You’ve just had surgery. During surgery, you were given medicine called anesthesia to keep you relaxed and free of pain. After surgery, you may have some pain or nausea. This is common. Here are some tips for feeling better and getting well after surgery.   Going  home  Your healthcare provider will show you how to take care of yourself when you go home. They'll also answer your questions. Have an adult family member or friend drive you home. For the first 24 hours after your surgery:   Don't drive or use heavy equipment.  Don't make important decisions or sign legal papers.  Take medicines as directed.  Don't drink alcohol.  Have someone stay with you, if needed. They can watch for problems and help keep you safe.  Be sure to go to all follow-up visits with your healthcare provider. And rest after your surgery for as long as your provider tells you to.   Coping with pain  If you have pain after surgery, pain medicine will help you feel better. Take it as directed, before pain becomes severe. Also, ask your healthcare provider or pharmacist about other ways to control pain. This might be with heat, ice, or relaxation. And follow any other instructions your surgeon or nurse gives you.      Stay on schedule with your medicine.     Tips for taking pain medicine  To get the best relief possible, remember these points:   Pain medicines can upset your stomach. Taking them with a little food may help.  Most pain relievers taken by mouth need at least 20 to 30 minutes to start to work.  Don't wait till your pain becomes severe before you take your medicine. Try to time your medicine so that you can take it before starting an activity. This might be before you get dressed, go for a walk, or sit down for dinner.  Constipation is a common side effect of some pain medicines. Call your healthcare provider before taking any medicines such as laxatives or stool softeners to help ease constipation. Also ask if you should skip any foods. Drinking lots of fluids and eating foods such as fruits and vegetables that are high in fiber can also help. Remember, don't take laxatives unless your surgeon has prescribed them.  Drinking alcohol and taking pain medicine can cause dizziness and slow your  breathing. It can even be deadly. Don't drink alcohol while taking pain medicine.  Pain medicine can make you react more slowly to things. Don't drive or run machinery while taking pain medicine.  Your healthcare provider may tell you to take acetaminophen to help ease your pain. Ask them how much you're supposed to take each day. Acetaminophen or other pain relievers may interact with your prescription medicines or other over-the-counter (OTC) medicines. Some prescription medicines have acetaminophen and other ingredients in them. Using both prescription and OTC acetaminophen for pain can cause you to accidentally overdose. Read the labels on your OTC medicines with care. This will help you to clearly know the list of ingredients, how much to take, and any warnings. It may also help you not take too much acetaminophen. If you have questions or don't understand the information, ask your pharmacist or healthcare provider to explain it to you before you take the OTC medicine.   Managing nausea  Some people have an upset stomach (nausea) after surgery. This is often because of anesthesia, pain, or pain medicine, less movement of food in the stomach, or the stress of surgery. These tips will help you handle nausea and eat healthy foods as you get better. If you were on a special food plan before surgery, ask your healthcare provider if you should follow it while you get better. Check with your provider on how your eating should progress. It may depend on the surgery you had. These general tips may help:   Don't push yourself to eat. Your body will tell you when to eat and how much.  Start off with clear liquids and soup. They're easier to digest.  Next try semi-solid foods as you feel ready. These include mashed potatoes, applesauce, and gelatin.  Slowly move to solid foods. Don’t eat fatty, rich, or spicy foods at first.  Don't force yourself to have 3 large meals a day. Instead eat smaller amounts more often.  Take  pain medicines with a small amount of solid food, such as crackers or toast. This helps prevent nausea.  When to call your healthcare provider  Call your healthcare provider right away if you have any of these:   You still have too much pain, or the pain gets worse, after taking the medicine. The medicine may not be strong enough. Or there may be a complication from the surgery.  You feel too sleepy, dizzy, or groggy. The medicine may be too strong.  Side effects such as nausea or vomiting. Your healthcare provider may advise taking other medicines to .  Skin changes such as rash, itching, or hives. This may mean you have an allergic reaction. Your provider may advise taking other medicines.  The incision looks different (for instance, part of it opens up).  Bleeding or fluid leaking from the incision site, and weren't told to expect that.  Fever of 100.4°F (38°C) or higher, or as directed by your provider.  Call 911  Call 911 right away if you have:   Trouble breathing  Facial swelling    If you have obstructive sleep apnea   You were given anesthesia medicine during surgery to keep you comfortable and free of pain. After surgery, you may have more apnea spells because of this medicine and other medicines you were given. The spells may last longer than normal.    At home:  Keep using the continuous positive airway pressure (CPAP) device when you sleep. Unless your healthcare provider tells you not to, use it when you sleep, day or night. CPAP is a common device used to treat obstructive sleep apnea.  Talk with your provider before taking any pain medicine, muscle relaxants, or sedatives. Your provider will tell you about the possible dangers of taking these medicines.  Contact your provider if your sleeping changes a lot even when taking medicines as directed.  Margarita last reviewed this educational content on 10/1/2021  © 5370-0933 [employer]. All rights reserved. This information is not intended  as a substitute for professional medical care. Always follow your healthcare professional's instructions.

## 2024-03-23 ENCOUNTER — LAB ENCOUNTER (OUTPATIENT)
Dept: LAB | Facility: HOSPITAL | Age: 74
End: 2024-03-23
Attending: UROLOGY
Payer: MEDICARE

## 2024-03-23 DIAGNOSIS — Z01.818 PRE-OP TESTING: ICD-10-CM

## 2024-03-23 LAB
ANION GAP SERPL CALC-SCNC: 0 MMOL/L (ref 0–18)
ANTIBODY SCREEN: NEGATIVE
BASOPHILS # BLD AUTO: 0.02 X10(3) UL (ref 0–0.2)
BASOPHILS NFR BLD AUTO: 0.3 %
BUN BLD-MCNC: 12 MG/DL (ref 9–23)
BUN/CREAT SERPL: 11.2 (ref 10–20)
CALCIUM BLD-MCNC: 9.2 MG/DL (ref 8.7–10.4)
CHLORIDE SERPL-SCNC: 111 MMOL/L (ref 98–112)
CO2 SERPL-SCNC: 27 MMOL/L (ref 21–32)
CREAT BLD-MCNC: 1.07 MG/DL
DEPRECATED RDW RBC AUTO: 43.8 FL (ref 35.1–46.3)
EGFRCR SERPLBLD CKD-EPI 2021: 73 ML/MIN/1.73M2 (ref 60–?)
EOSINOPHIL # BLD AUTO: 0.17 X10(3) UL (ref 0–0.7)
EOSINOPHIL NFR BLD AUTO: 2.3 %
ERYTHROCYTE [DISTWIDTH] IN BLOOD BY AUTOMATED COUNT: 13.7 % (ref 11–15)
FASTING STATUS PATIENT QL REPORTED: YES
GLUCOSE BLD-MCNC: 89 MG/DL (ref 70–99)
HCT VFR BLD AUTO: 47.1 %
HGB BLD-MCNC: 16.1 G/DL
IMM GRANULOCYTES # BLD AUTO: 0.03 X10(3) UL (ref 0–1)
IMM GRANULOCYTES NFR BLD: 0.4 %
LYMPHOCYTES # BLD AUTO: 2.2 X10(3) UL (ref 1–4)
LYMPHOCYTES NFR BLD AUTO: 30.1 %
MCH RBC QN AUTO: 29.8 PG (ref 26–34)
MCHC RBC AUTO-ENTMCNC: 34.2 G/DL (ref 31–37)
MCV RBC AUTO: 87.2 FL
MONOCYTES # BLD AUTO: 0.74 X10(3) UL (ref 0.1–1)
MONOCYTES NFR BLD AUTO: 10.1 %
NEUTROPHILS # BLD AUTO: 4.15 X10 (3) UL (ref 1.5–7.7)
NEUTROPHILS # BLD AUTO: 4.15 X10(3) UL (ref 1.5–7.7)
NEUTROPHILS NFR BLD AUTO: 56.8 %
OSMOLALITY SERPL CALC.SUM OF ELEC: 285 MOSM/KG (ref 275–295)
PLATELET # BLD AUTO: 204 10(3)UL (ref 150–450)
POTASSIUM SERPL-SCNC: 4.2 MMOL/L (ref 3.5–5.1)
RBC # BLD AUTO: 5.4 X10(6)UL
RH BLOOD TYPE: POSITIVE
RH BLOOD TYPE: POSITIVE
SODIUM SERPL-SCNC: 138 MMOL/L (ref 136–145)
WBC # BLD AUTO: 7.3 X10(3) UL (ref 4–11)

## 2024-03-23 PROCEDURE — 80048 BASIC METABOLIC PNL TOTAL CA: CPT

## 2024-03-23 PROCEDURE — 86850 RBC ANTIBODY SCREEN: CPT

## 2024-03-23 PROCEDURE — 85025 COMPLETE CBC W/AUTO DIFF WBC: CPT

## 2024-03-23 PROCEDURE — 86901 BLOOD TYPING SEROLOGIC RH(D): CPT

## 2024-03-23 PROCEDURE — 36415 COLL VENOUS BLD VENIPUNCTURE: CPT

## 2024-03-23 PROCEDURE — 86900 BLOOD TYPING SEROLOGIC ABO: CPT

## 2024-03-27 ENCOUNTER — ANESTHESIA (OUTPATIENT)
Dept: SURGERY | Facility: HOSPITAL | Age: 74
End: 2024-03-27
Payer: MEDICARE

## 2024-03-27 ENCOUNTER — HOSPITAL ENCOUNTER (OUTPATIENT)
Facility: HOSPITAL | Age: 74
Setting detail: HOSPITAL OUTPATIENT SURGERY
Discharge: HOME | End: 2024-03-27
Attending: UROLOGY | Admitting: UROLOGY
Payer: MEDICARE

## 2024-03-27 ENCOUNTER — ANESTHESIA EVENT (OUTPATIENT)
Dept: SURGERY | Facility: HOSPITAL | Age: 74
End: 2024-03-27
Payer: MEDICARE

## 2024-03-27 VITALS
TEMPERATURE: 98 F | HEIGHT: 69 IN | OXYGEN SATURATION: 96 % | DIASTOLIC BLOOD PRESSURE: 83 MMHG | RESPIRATION RATE: 16 BRPM | HEART RATE: 85 BPM | BODY MASS INDEX: 26.66 KG/M2 | WEIGHT: 180 LBS | SYSTOLIC BLOOD PRESSURE: 165 MMHG

## 2024-03-27 DIAGNOSIS — Z01.818 PRE-OP TESTING: Primary | ICD-10-CM

## 2024-03-27 PROCEDURE — 07TC4ZZ RESECTION OF PELVIS LYMPHATIC, PERCUTANEOUS ENDOSCOPIC APPROACH: ICD-10-PCS | Performed by: UROLOGY

## 2024-03-27 PROCEDURE — 8E0W4CZ ROBOTIC ASSISTED PROCEDURE OF TRUNK REGION, PERCUTANEOUS ENDOSCOPIC APPROACH: ICD-10-PCS | Performed by: UROLOGY

## 2024-03-27 PROCEDURE — 88307 TISSUE EXAM BY PATHOLOGIST: CPT | Performed by: UROLOGY

## 2024-03-27 PROCEDURE — 0VT04ZZ RESECTION OF PROSTATE, PERCUTANEOUS ENDOSCOPIC APPROACH: ICD-10-PCS | Performed by: UROLOGY

## 2024-03-27 PROCEDURE — 88309 TISSUE EXAM BY PATHOLOGIST: CPT | Performed by: UROLOGY

## 2024-03-27 RX ORDER — SODIUM CHLORIDE 9 MG/ML
INJECTION, SOLUTION INTRAVENOUS CONTINUOUS PRN
Status: DISCONTINUED | OUTPATIENT
Start: 2024-03-27 | End: 2024-03-27 | Stop reason: SURG

## 2024-03-27 RX ORDER — HYDROMORPHONE HYDROCHLORIDE 1 MG/ML
0.4 INJECTION, SOLUTION INTRAMUSCULAR; INTRAVENOUS; SUBCUTANEOUS EVERY 5 MIN PRN
Status: DISCONTINUED | OUTPATIENT
Start: 2024-03-27 | End: 2024-03-27

## 2024-03-27 RX ORDER — MORPHINE SULFATE 4 MG/ML
4 INJECTION, SOLUTION INTRAMUSCULAR; INTRAVENOUS EVERY 10 MIN PRN
Status: DISCONTINUED | OUTPATIENT
Start: 2024-03-27 | End: 2024-03-27

## 2024-03-27 RX ORDER — HYDROMORPHONE HYDROCHLORIDE 1 MG/ML
0.6 INJECTION, SOLUTION INTRAMUSCULAR; INTRAVENOUS; SUBCUTANEOUS EVERY 5 MIN PRN
Status: DISCONTINUED | OUTPATIENT
Start: 2024-03-27 | End: 2024-03-27

## 2024-03-27 RX ORDER — SODIUM CHLORIDE, SODIUM LACTATE, POTASSIUM CHLORIDE, CALCIUM CHLORIDE 600; 310; 30; 20 MG/100ML; MG/100ML; MG/100ML; MG/100ML
INJECTION, SOLUTION INTRAVENOUS CONTINUOUS
Status: DISCONTINUED | OUTPATIENT
Start: 2024-03-27 | End: 2024-03-27

## 2024-03-27 RX ORDER — BUPIVACAINE HYDROCHLORIDE 2.5 MG/ML
INJECTION, SOLUTION EPIDURAL; INFILTRATION; INTRACAUDAL AS NEEDED
Status: DISCONTINUED | OUTPATIENT
Start: 2024-03-27 | End: 2024-03-27 | Stop reason: HOSPADM

## 2024-03-27 RX ORDER — ACETAMINOPHEN 500 MG
1000 TABLET ORAL ONCE
Status: COMPLETED | OUTPATIENT
Start: 2024-03-27 | End: 2024-03-27

## 2024-03-27 RX ORDER — HYDROMORPHONE HYDROCHLORIDE 1 MG/ML
0.2 INJECTION, SOLUTION INTRAMUSCULAR; INTRAVENOUS; SUBCUTANEOUS EVERY 5 MIN PRN
Status: DISCONTINUED | OUTPATIENT
Start: 2024-03-27 | End: 2024-03-27

## 2024-03-27 RX ORDER — MIDAZOLAM HYDROCHLORIDE 1 MG/ML
INJECTION INTRAMUSCULAR; INTRAVENOUS AS NEEDED
Status: DISCONTINUED | OUTPATIENT
Start: 2024-03-27 | End: 2024-03-27 | Stop reason: SURG

## 2024-03-27 RX ORDER — ONDANSETRON 2 MG/ML
INJECTION INTRAMUSCULAR; INTRAVENOUS AS NEEDED
Status: DISCONTINUED | OUTPATIENT
Start: 2024-03-27 | End: 2024-03-27 | Stop reason: SURG

## 2024-03-27 RX ORDER — MORPHINE SULFATE 4 MG/ML
2 INJECTION, SOLUTION INTRAMUSCULAR; INTRAVENOUS EVERY 10 MIN PRN
Status: DISCONTINUED | OUTPATIENT
Start: 2024-03-27 | End: 2024-03-27

## 2024-03-27 RX ORDER — GLYCOPYRROLATE 0.2 MG/ML
INJECTION, SOLUTION INTRAMUSCULAR; INTRAVENOUS AS NEEDED
Status: DISCONTINUED | OUTPATIENT
Start: 2024-03-27 | End: 2024-03-27 | Stop reason: SURG

## 2024-03-27 RX ORDER — LIDOCAINE HYDROCHLORIDE 10 MG/ML
INJECTION, SOLUTION EPIDURAL; INFILTRATION; INTRACAUDAL; PERINEURAL AS NEEDED
Status: DISCONTINUED | OUTPATIENT
Start: 2024-03-27 | End: 2024-03-27 | Stop reason: SURG

## 2024-03-27 RX ORDER — KETOROLAC TROMETHAMINE 15 MG/ML
INJECTION, SOLUTION INTRAMUSCULAR; INTRAVENOUS AS NEEDED
Status: DISCONTINUED | OUTPATIENT
Start: 2024-03-27 | End: 2024-03-27 | Stop reason: SURG

## 2024-03-27 RX ORDER — LIDOCAINE HYDROCHLORIDE ANHYDROUS AND DEXTROSE MONOHYDRATE .8; 5 G/100ML; G/100ML
INJECTION, SOLUTION INTRAVENOUS CONTINUOUS PRN
Status: DISCONTINUED | OUTPATIENT
Start: 2024-03-27 | End: 2024-03-27 | Stop reason: SURG

## 2024-03-27 RX ORDER — LABETALOL HYDROCHLORIDE 5 MG/ML
5 INJECTION, SOLUTION INTRAVENOUS EVERY 5 MIN PRN
Status: DISCONTINUED | OUTPATIENT
Start: 2024-03-27 | End: 2024-03-27

## 2024-03-27 RX ORDER — NALOXONE HYDROCHLORIDE 0.4 MG/ML
0.08 INJECTION, SOLUTION INTRAMUSCULAR; INTRAVENOUS; SUBCUTANEOUS AS NEEDED
Status: DISCONTINUED | OUTPATIENT
Start: 2024-03-27 | End: 2024-03-27

## 2024-03-27 RX ORDER — CEFAZOLIN SODIUM/WATER 2 G/20 ML
2 SYRINGE (ML) INTRAVENOUS ONCE
Status: COMPLETED | OUTPATIENT
Start: 2024-03-27 | End: 2024-03-27

## 2024-03-27 RX ORDER — METOCLOPRAMIDE HYDROCHLORIDE 5 MG/ML
10 INJECTION INTRAMUSCULAR; INTRAVENOUS EVERY 8 HOURS PRN
Status: DISCONTINUED | OUTPATIENT
Start: 2024-03-27 | End: 2024-03-27

## 2024-03-27 RX ORDER — ONDANSETRON 2 MG/ML
4 INJECTION INTRAMUSCULAR; INTRAVENOUS EVERY 6 HOURS PRN
Status: DISCONTINUED | OUTPATIENT
Start: 2024-03-27 | End: 2024-03-27

## 2024-03-27 RX ORDER — LIDOCAINE HYDROCHLORIDE 40 MG/ML
SOLUTION TOPICAL AS NEEDED
Status: DISCONTINUED | OUTPATIENT
Start: 2024-03-27 | End: 2024-03-27 | Stop reason: SURG

## 2024-03-27 RX ORDER — DEXAMETHASONE SODIUM PHOSPHATE 4 MG/ML
VIAL (ML) INJECTION AS NEEDED
Status: DISCONTINUED | OUTPATIENT
Start: 2024-03-27 | End: 2024-03-27 | Stop reason: SURG

## 2024-03-27 RX ORDER — HEPARIN SODIUM 5000 [USP'U]/ML
5000 INJECTION, SOLUTION INTRAVENOUS; SUBCUTANEOUS ONCE
Qty: 1 ML | Refills: 0 | Status: COMPLETED | OUTPATIENT
Start: 2024-03-27 | End: 2024-03-27

## 2024-03-27 RX ORDER — ROCURONIUM BROMIDE 10 MG/ML
INJECTION, SOLUTION INTRAVENOUS AS NEEDED
Status: DISCONTINUED | OUTPATIENT
Start: 2024-03-27 | End: 2024-03-27 | Stop reason: SURG

## 2024-03-27 RX ORDER — ACETAMINOPHEN 500 MG
1000 TABLET ORAL ONCE
Status: DISCONTINUED | OUTPATIENT
Start: 2024-03-27 | End: 2024-03-27 | Stop reason: HOSPADM

## 2024-03-27 RX ORDER — MORPHINE SULFATE 10 MG/ML
6 INJECTION, SOLUTION INTRAMUSCULAR; INTRAVENOUS EVERY 10 MIN PRN
Status: DISCONTINUED | OUTPATIENT
Start: 2024-03-27 | End: 2024-03-27

## 2024-03-27 RX ADMIN — LIDOCAINE HYDROCHLORIDE ANHYDROUS AND DEXTROSE MONOHYDRATE 2 MG/MIN: .8; 5 INJECTION, SOLUTION INTRAVENOUS at 07:50:00

## 2024-03-27 RX ADMIN — KETOROLAC TROMETHAMINE 15 MG: 15 INJECTION, SOLUTION INTRAMUSCULAR; INTRAVENOUS at 10:55:00

## 2024-03-27 RX ADMIN — SODIUM CHLORIDE, SODIUM LACTATE, POTASSIUM CHLORIDE, CALCIUM CHLORIDE: 600; 310; 30; 20 INJECTION, SOLUTION INTRAVENOUS at 11:24:00

## 2024-03-27 RX ADMIN — ROCURONIUM BROMIDE 10 MG: 10 INJECTION, SOLUTION INTRAVENOUS at 09:29:00

## 2024-03-27 RX ADMIN — LIDOCAINE HYDROCHLORIDE 4 ML: 40 SOLUTION TOPICAL at 07:42:00

## 2024-03-27 RX ADMIN — ROCURONIUM BROMIDE 20 MG: 10 INJECTION, SOLUTION INTRAVENOUS at 07:54:00

## 2024-03-27 RX ADMIN — LIDOCAINE HYDROCHLORIDE 50 MG: 10 INJECTION, SOLUTION EPIDURAL; INFILTRATION; INTRACAUDAL; PERINEURAL at 07:39:00

## 2024-03-27 RX ADMIN — ROCURONIUM BROMIDE 50 MG: 10 INJECTION, SOLUTION INTRAVENOUS at 07:41:00

## 2024-03-27 RX ADMIN — MIDAZOLAM HYDROCHLORIDE 1 MG: 1 INJECTION INTRAMUSCULAR; INTRAVENOUS at 07:33:00

## 2024-03-27 RX ADMIN — ONDANSETRON 4 MG: 2 INJECTION INTRAMUSCULAR; INTRAVENOUS at 07:35:00

## 2024-03-27 RX ADMIN — DEXAMETHASONE SODIUM PHOSPHATE 8 MG: 4 MG/ML VIAL (ML) INJECTION at 07:35:00

## 2024-03-27 RX ADMIN — ROCURONIUM BROMIDE 10 MG: 10 INJECTION, SOLUTION INTRAVENOUS at 10:18:00

## 2024-03-27 RX ADMIN — GLYCOPYRROLATE 0.2 MG: 0.2 INJECTION, SOLUTION INTRAMUSCULAR; INTRAVENOUS at 07:35:00

## 2024-03-27 RX ADMIN — CEFAZOLIN SODIUM/WATER 2 G: 2 G/20 ML SYRINGE (ML) INTRAVENOUS at 07:56:00

## 2024-03-27 RX ADMIN — SODIUM CHLORIDE: 9 INJECTION, SOLUTION INTRAVENOUS at 08:00:00

## 2024-03-27 NOTE — OPERATIVE REPORT
Patients Name: Mike Buck  Attending Physician: Jamie Omer MD  CSN: [id number]    [facility]  MRN: [id number]    YOB: 1950  Admission Date: 3/27/2024     Operative Note    Patient Name: Mike Buck    Preoperative Diagnosis: Prostate cancer    Postoperative Diagnosis: same    Primary Surgeon: Jamie Omer MD    Assistant: Katia Dickerson CSA, MD    Procedures: robotic radical prostatectomy, bilateral pelvic lymph node dissection, bladder neck reconstruction    Surgical Findings: bilateral nerve sparing    Anesthesia: General    Complications: none    Implants: none    Specimen: prostate, bilateral pelvic lymph nodes    Drains: champagne    Condition: stable    Estimated Blood Loss: 50cc        Operative note: Patient is a 74 yo male with a history of prostate cancer who presented with a PSA of 14.1 and a biopsy demonstrating radha 3 + 4 prostate cancer. His staging demonstrated localized disease and we discussed management options.  He elected to proceed with radical prostatectomy.  Risks were discussed with patient and he expressed understanding and agreed to proceed.     Operative note:Patient was identified, taken to the OR after induction of general anesthesia and placement of airway, he was repositioned into supine on a pink pad. His arms were placed in ergonomic position and secured to his side.  We prepped and draped the patient's abdomen, genitals and groin in sterile fashion.  We made a 1 cm incision at the level of the umbilicus, placed a Veress needle into the abdomen and safely insufflated with 15 mmHg.  We placed an 8mm trocar at this site.  We then placed three 8 mm robotic trocars, 11 mm assistant trocar, and a 5 mm assistant trocar all under direct vision under local anesthetic. We lowered the pressure to 8mmHG.   We then docked the robot to the patient after he was placed in steep Trendelenburg to 23 degrees. Left sided and right sided colonic adhesions were  taken down.  We then retracted the colon into the abdomen and made a posterior peritoneal incision, dissected the seminal vesicles and vas deferens.  The posterior space was made, anterior space was made.   We then performed bilateral pelvic lymph node dissection.  The borders of our dissection were the external iliac vein down to the deep obturator fossa. Weck clips were placed proximally and distally.  The obturator nerve, artery and vein were identified and preserved.  We then placed packets separately in EndoCatch bags and collected them for analysis.    We then mobilized the bladder off the anterior abdominal wall, entered the space of Retzius and  mobilized the periprostatic fat off the prostate, opened up the endopelvic fascia in a blunt dissection manner from the base to apex.  We then secured our deep dorsal vein using an 0 Vicryl suture in interrupted fashion as well as a back bleeder suture.  We then made an anterior cystotomy, dissected down to bladder circumferentially.  The bladder neck was larger given the footprint of the large prostate and a medial lobe that required dissection.  We reconstructed the bladder neck with plicating sutures of 3-0 vicryl in interrupted fashion.  We then retracted the seminal vesicles and the vas deferens anteriorly, ligated the posterior pedicle of the prostate with Weck clips, and then ligated the pedicle at the base then performed complete neurovascular bundle dissection on the right side and left side.  From base to apex, this dissection was completed.  We then made completed our transection of the dorsal vein as well as transection of the anterior urethra.  We then completed the posterior dissection of the urethra, and the prostate was then placed in an EndoCatch bag.  We irrigated the pelvis with water.  We completed hemostasis with suture ligation. WE used fibrillar and surgiflo on the nerve bundle fossa.   We then placed a Abhijit approximation suture of 3-0 V-Loc  in running fashion to secure the posterior fascia, and then completed the urethrovesical anastomosis using 3-0 V-Loc suture in a running fashion.  A 16-Yakut Garces catheter was placed in the bladder without difficulty.  We irrigated the bladder.  There were no leaks.  The lateral trocar was closed with an 0 Vicryl.  All port sites were then irrigated.  We made an extraction incision in the midline and removed the specimen.  We then removed all trocar sites under direct vision.  We closed the midline incision with an 0 PDS in figure-of-eight fashion x3.  Abdomen was then reinsufflated and closure sites were inspected.  Drain stitch was placed.  All port sites were irrigated and closed with 4-0 Monocryl in subcuticular fashion followed by skin glue. The patient was awoken from anesthesia, extubated, taken to postanesthesia care unit in stable condition.  He tolerated the procedure well.

## 2024-03-27 NOTE — H&P
Reviewed by Dr. Sams from 1-3-24    The above referenced H&P was reviewed by Jamie Omer MD on 3/27/2024, the patient was examined and no significant changes have occurred in the patient's condition since the H&P was performed.  Risks and benefits were discussed, proceed with procedure as planned.

## 2024-03-27 NOTE — ANESTHESIA PROCEDURE NOTES
Airway  Date/Time: 3/27/2024 7:43 AM  Urgency: Elective    Airway not difficult    General Information and Staff    Patient location during procedure: OR  Anesthesiologist: Segundo Schaffer MD  Resident/CRNA: Arcelia Aparicio CRNA  Performed: CRNA   Performed by: Arcelia Aparicio CRNA  Authorized by: Segundo Schaffer MD      Indications and Patient Condition  Indications for airway management: anesthesia  Sedation level: deep  Preoxygenated: yes  Patient position: sniffing  Mask difficulty assessment: 2 - vent by mask + OA or adjuvant +/- NMBA    Final Airway Details  Final airway type: endotracheal airway      Successful airway: ETT  Cuffed: yes   Successful intubation technique: direct laryngoscopy  Facilitating devices/methods: intubating stylet  Endotracheal tube insertion site: oral  Blade: Sonya  Blade size: #4  ETT size (mm): 7.5    Cormack-Lehane Classification: grade I - full view of glottis  Placement verified by: capnometry   Measured from: teeth  ETT to teeth (cm): 22  Number of attempts at approach: 1    Additional Comments  Atraumatic intubation. (+) ETCO2 and BBSE. ETT secured.

## 2024-03-27 NOTE — ANESTHESIA POSTPROCEDURE EVALUATION
Patient: Mike Buck    Procedure Summary       Date: 03/27/24 Room / Location: [facility] MAIN OR 06 / [facility] MAIN OR    Anesthesia Start: 0735 Anesthesia Stop:     Procedure: XI robotic assisted laparoscopic radical prostatectomy, bilateral pelvic lymph node dissection, bladder neck reconstruction (Bilateral: Abdomen) Diagnosis: (Prostate cancer)    Surgeons: Jamie Omer MD Anesthesiologist: Segundo Schaffer MD    Anesthesia Type: general ASA Status: 3            Anesthesia Type: general    Vitals Value Taken Time   /87 03/27/24 1129   Temp 97.4 °F (36.3 °C) 03/27/24 1129   Pulse 90 03/27/24 1129   Resp 19 03/27/24 1129   SpO2 98 % 03/27/24 1129   Vitals shown include unfiled device data.    EM AN Post Evaluation:   Patient Evaluated in PACU  Patient Participation: complete - patient participated  Level of Consciousness: sleepy but conscious  Pain Score: 0  Pain Management: adequate  Airway Patency:patent  Dental exam unchanged from preop  Yes    Nausea/Vomiting: none  Cardiovascular Status: hemodynamically stable  Respiratory Status: nasal cannula  Postoperative Hydration acceptable      Arcelia Aparicio CRNA  3/27/2024 11:29 AM

## 2024-03-27 NOTE — ANESTHESIA PREPROCEDURE EVALUATION
Anesthesia PreOp Note    HPI:     Mike Buck is a 73 year old male who presents for preoperative consultation requested by: Jamie Omer MD    Date of Surgery: 3/27/2024    Procedure(s):  XI robotic assisted laparoscopic radical prostatectomy, bilateral pelvic lymph node dissection  Indication: Prostate cancer    Relevant Problems   No relevant active problems       NPO:  Last Liquid Consumption Date: 03/27/24  Last Liquid Consumption Time: 0130  Last Solid Consumption Date: 03/26/24  Last Solid Consumption Time: 0800  Last Liquid Consumption Date: 03/27/24          History Review:  Patient Active Problem List    Diagnosis Date Noted    Urgency of micturition 10/13/2019    Nocturia 10/13/2019    History of adenomatous polyp of colon 11/21/2018    Platelet inhibition due to Plavix 11/14/2018    SX/GLOBAL/  /LOM/ RIGHT KNEE SCOPE / DOS 08/18/17 / EXP 11/16/17 08/21/2017    BPH with obstruction/lower urinary tract symptoms 05/16/2017    Internal derangement of knee, right 05/16/2017    Surgical aftercare, musculoskeletal system 01/11/2017    Sx.12/30/16, CPT.43084, Release Left Trigger Thumb, w/, Global Exp.3/30/17 12/07/2016    Trigger finger of right thumb 12/07/2016    Trigger thumb of both hands 06/20/2016    Right sided sciatica 11/11/2015    Elevated PSA 10/06/2015    Benign non-nodular prostatic hyperplasia with lower urinary tract symptoms 10/06/2015    [facility]O/ RADHAMES TO ASSIST / DOS 9-17-15 / EXP 12-16-15 09/23/2015    Acquired hyperlipoproteinemia 07/10/2015    Essential hypertension, benign 07/10/2015    Urinary frequency 07/10/2015    Depression 07/10/2015    SNHL (sensorineural hearing loss) 02/08/2013    Knee pain 11/09/2011    CAD (coronary artery disease) 04/19/2011    Lichenification and lichen simplex chronicus 05/28/2009       Past Medical History:   Diagnosis Date    Anesthesia complication     Anxiety     BPH (benign prostatic  hyperplasia)     Cold with flu 09/09/2015    per pt: sore throat since 9/9/15; denies fever    CORONARY ARTERY DISEASE 01/01/2002    CABG x 5 vessels    CORONARY ARTERY DISEASE 04/10/2013    PTCA c stent  x 2    Depression     after CABG    Hearing impairment     hearing aids    High blood pressure     History of chest pain     Hx of motion sickness     Hypercholesterolemia     HYPERLIPIDEMIA     HYPERTENSION     Hypertension     Infectious disease 01/01/2010    shingles x 1 incident    Osteoarthritis     OTHER DISEASES     chronic urticaria    OTHER DISEASES 01/01/2002    + rectal polyp     Rectal polyp 01/01/2002       Past Surgical History:   Procedure Laterality Date    ANGIOPLASTY (CORONARY)  4/10    x 1 stent    ANGIOPLASTY (CORONARY)  2013    x 1 stent    CABG  2002    x 5 vessels    COLONOSCOPY  2002     1 adenomatous polyp    COLONOSCOPY  07/2010    no polyps, repeat 5 yrs    COLONOSCOPY  11/2018    KNEE ARTHROSCOPY Right 08/18/2017    Dr. Manriquez    OTHER SURGICAL HISTORY  1980's    lipomectomy back    OTHER SURGICAL HISTORY Right 1984    heel spur removed    PARTIAL REMOVAL, CLAVICLE Left 9/17/2015    Procedure: SHOULDER OPEN ROTATOR CUFF REPAIR;  Surgeon: Leopoldo Manriquez MD;  Location: [facility]    SHLDR ARTHROSCOP,PART DEBRIDE Left 9/17/2015    Procedure: SHOULDER OPEN ROTATOR CUFF REPAIR;  Surgeon: Leopoldo Manriquez MD;  Location: [facility]    TONSILLECTOMY      as child       Medications Prior to Admission   Medication Sig Dispense Refill Last Dose    BIOTIN OR Take 1 tablet by mouth daily.   3/21/2024    ramipril 10 MG Oral Cap TAKE 1 CAPSULE EVERY DAY FOR BLOOD PRESSURE MUST BE SEEN FOR ANY FURTHER REFILLS. (Patient taking differently: Take 1 capsule (10 mg total) by mouth at bedtime. TAKE 1 CAPSULE EVERY DAY FOR BLOOD PRESSURE MUST BE SEEN FOR ANY FURTHER REFILLS.) 90 capsule 0 Past Week    escitalopram 20 MG Oral Tab TAKE 1 TABLET EVERY DAY FOR ANXIETY (NEED MD APPOINTMENT FOR REFILLS) 90  tablet 0 3/27/2024    tamsulosin (FLOMAX) cap Take 1 capsule (0.4 mg total) by mouth daily. 90 capsule 1 3/27/2024    atorvastatin 40 MG Oral Tab Take 1 tablet (40 mg total) by mouth As Directed.   3/26/2024    Clopidogrel Bisulfate 75 MG Oral Tab Take 1 tablet (75 mg total) by mouth As Directed.   3/21/2024    aspirin 81 MG Oral Tab Take 1 tablet (81 mg total) by mouth daily.   3/21/2024    Oxybutynin Chloride ER 5 MG Oral Tablet 24 Hr Take 1 tablet (5 mg total) by mouth daily. 90 tablet 3 More than a month    indomethacin 25 MG Oral Cap Take one tablet by mouth up to every six hours with food for gout 30 capsule 0 More than a month     Current Facility-Administered Medications Ordered in Epic   Medication Dose Route Frequency Provider Last Rate Last Admin    lactated ringers infusion   Intravenous Continuous Jamie Omer MD 20 mL/hr at 24 0606 New Bag at 24 0606    acetaminophen (Tylenol Extra Strength) tab 1,000 mg  1,000 mg Oral Once Jamie Omer MD        ceFAZolin (Ancef) 2 g in 20mL IV syringe premix  2 g Intravenous Once Jamie Omer MD         No current [facility]-ordered outpatient medications on file.       Allergies   Allergen Reactions    Other HIVES     Food - spices       Family History   Problem Relation Age of Onset    Heart Disorder Father         MI    Diabetes Father     Other Mother         copd    Hypertension Mother     Hypertension Brother     Other Brother         murdered    Hypertension Brother      Social History     Socioeconomic History    Marital status:     Number of children: 3   Occupational History    Occupation: -retired   Tobacco Use    Smoking status: Former     Types: Cigarettes     Quit date: 1980     Years since quittin.2    Smokeless tobacco: Never   Vaping Use    Vaping Use: Never used   Substance and Sexual Activity    Alcohol use: No     Alcohol/week: 0.0 standard drinks of alcohol     Comment: one per year    Drug use: No        Available pre-op labs reviewed.  Lab Results   Component Value Date    WBC 7.3 03/23/2024    RBC 5.40 03/23/2024    HGB 16.1 03/23/2024    HCT 47.1 03/23/2024    MCV 87.2 03/23/2024    MCH 29.8 03/23/2024    MCHC 34.2 03/23/2024    RDW 13.7 03/23/2024    .0 03/23/2024     Lab Results   Component Value Date     03/23/2024    K 4.2 03/23/2024     03/23/2024    CO2 27.0 03/23/2024    BUN 12 03/23/2024    CREATSERUM 1.07 03/23/2024    GLU 89 03/23/2024    CA 9.2 03/23/2024          Vital Signs:  Body mass index is 26.58 kg/m².   height is 1.753 m (5' 9\") and weight is 81.6 kg (180 lb). His oral temperature is 97.8 °F (36.6 °C). His blood pressure is 162/84 (abnormal) and his pulse is 56. His respiration is 16 and oxygen saturation is 99%.   Vitals:    03/21/24 1730 03/27/24 0550   BP:  (!) 162/84   Pulse:  56   Resp:  16   Temp:  97.8 °F (36.6 °C)   TempSrc:  Oral   SpO2:  99%   Weight: 81.6 kg (180 lb)    Height: 1.753 m (5' 9\")         Anesthesia Evaluation     Patient summary reviewed and Nursing notes reviewed    History of anesthetic complications (slow to wake up)   Airway   Mallampati: II  TM distance: >3 FB  Neck ROM: full  Dental    (+) lower dentures and upper dentures    Pulmonary - normal exam   Cardiovascular - normal exam  Exercise tolerance: good  (+) hypertension well controlled, CAD, CABG/stent (CABG x 5v 20 yrs ago), LU (able to walk up a flight of stairs and do daily activities without problems unless he walks up too fast)  (-) dysrhythmias, angina, CHF, orthopnea, PND    ROS comment: Reviewed cardiologist note and is cleared for surgery    Neuro/Psych    (+)  neuromuscular disease, anxiety/panic attacks,  depression      GI/Hepatic/Renal - negative ROS     Endo/Other - negative ROS   Abdominal  - normal exam                 Anesthesia Plan:   ASA:  3  Plan:   General  Monitors and Lines:   Additonal IV  Airway:  ETT  Post-op Pain Management: IV analgesics and Oral pain  medication  Informed Consent Plan and Risks Discussed With:  Patient  Discussed plan with:  CRNA      I have informed Mike Buck of the nature of the anesthetic plan, benefits, risks including possible dental damage if relevant, major complications, and any alternative forms of anesthetic management.   All of the patient's questions were answered to the best of my ability. The patient desires the anesthetic management as planned.  FREDDY MACK MD  3/27/2024 6:55 AM  Present on Admission:  **None**

## 2024-03-27 NOTE — ANESTHESIA PROCEDURE NOTES
Peripheral IV  Date/Time: 3/27/2024 7:55 AM  Inserted by: Arcelia Aparicio CRNA    Placement  Needle size: 18 G  Laterality: left  Location: forearm  Local anesthetic: none  Site prep: alcohol  Technique: anatomical landmarks  Attempts: 1

## 2024-03-31 ENCOUNTER — HOSPITAL ENCOUNTER (EMERGENCY)
Facility: HOSPITAL | Age: 74
Discharge: HOME | End: 2024-03-31
Attending: EMERGENCY MEDICINE
Payer: MEDICARE

## 2024-03-31 ENCOUNTER — APPOINTMENT (OUTPATIENT)
Dept: GENERAL RADIOLOGY | Facility: HOSPITAL | Age: 74
End: 2024-03-31
Attending: EMERGENCY MEDICINE
Payer: MEDICARE

## 2024-03-31 VITALS
SYSTOLIC BLOOD PRESSURE: 159 MMHG | RESPIRATION RATE: 18 BRPM | DIASTOLIC BLOOD PRESSURE: 77 MMHG | OXYGEN SATURATION: 96 % | HEART RATE: 72 BPM | TEMPERATURE: 98 F

## 2024-03-31 DIAGNOSIS — K59.00 CONSTIPATION, UNSPECIFIED CONSTIPATION TYPE: Primary | ICD-10-CM

## 2024-03-31 PROCEDURE — 96372 THER/PROPH/DIAG INJ SC/IM: CPT

## 2024-03-31 PROCEDURE — 74018 RADEX ABDOMEN 1 VIEW: CPT | Performed by: EMERGENCY MEDICINE

## 2024-03-31 PROCEDURE — 99283 EMERGENCY DEPT VISIT LOW MDM: CPT

## 2024-03-31 PROCEDURE — 99284 EMERGENCY DEPT VISIT MOD MDM: CPT

## 2024-04-01 NOTE — ED PROVIDER NOTES
Patient Seen in: [facility] Emergency Department    History     Chief Complaint   Patient presents with    Constipation    Postop/Procedure Problem       HPI    The patient presents to the ED complaining of constipation and some abdominal discomfort and bloating after recent prostatectomy.  He states that he was taking tramadol until this morning.  He now stopped taking this due to the constipation.  Has been taking stool softeners with no relief.    History reviewed.   Past Medical History:   Diagnosis Date    Anesthesia complication     Anxiety     BPH (benign prostatic hyperplasia)     Cold with flu 09/09/2015    per pt: sore throat since 9/9/15; denies fever    CORONARY ARTERY DISEASE 01/01/2002    CABG x 5 vessels    CORONARY ARTERY DISEASE 04/10/2013    PTCA c stent  x 2    Depression     after CABG    Hearing impairment     hearing aids    High blood pressure     History of chest pain     Hx of motion sickness     Hypercholesterolemia     HYPERLIPIDEMIA     HYPERTENSION     Hypertension     Infectious disease 01/01/2010    shingles x 1 incident    Osteoarthritis     OTHER DISEASES     chronic urticaria    OTHER DISEASES 01/01/2002    + rectal polyp     Rectal polyp 01/01/2002       History reviewed.   Past Surgical History:   Procedure Laterality Date    ANGIOPLASTY (CORONARY)  04/2010    x 1 stent    ANGIOPLASTY (CORONARY)  2013    x 1 stent    CABG  2002    x 5 vessels    COLONOSCOPY  2002     1 adenomatous polyp    COLONOSCOPY  07/2010    no polyps, repeat 5 yrs    COLONOSCOPY  11/2018    KNEE ARTHROSCOPY Right 08/18/2017    Dr. Manriquez    OTHER SURGICAL HISTORY  1980's    lipomectomy back    OTHER SURGICAL HISTORY Right 1984    heel spur removed    PARTIAL REMOVAL, CLAVICLE Left 09/17/2015    Procedure: SHOULDER OPEN ROTATOR CUFF REPAIR;  Surgeon: Leopoldo Manriquez MD;  Location: [facility]    PROSTATE SURGERY      SHLDR ARTHROSCOP,PART DEBRIDE Left 09/17/2015    Procedure: SHOULDER OPEN  ROTATOR CUFF REPAIR;  Surgeon: Leopoldo Manriquez MD;  Location: [facility]    TONSILLECTOMY      as child         Medications :  (Not in a hospital admission)       Family History   Problem Relation Age of Onset    Heart Disorder Father         MI    Diabetes Father     Other Mother         copd    Hypertension Mother     Hypertension Brother     Other Brother         murdered    Hypertension Brother        Smoking Status:   Social History     Socioeconomic History    Marital status:     Number of children: 3   Occupational History    Occupation: -retired   Tobacco Use    Smoking status: Former     Types: Cigarettes     Quit date: 1980     Years since quittin.2    Smokeless tobacco: Never   Vaping Use    Vaping Use: Never used   Substance and Sexual Activity    Alcohol use: No     Alcohol/week: 0.0 standard drinks of alcohol     Comment: one per year    Drug use: No       Constitutional and vital signs reviewed.      Social History and Family History elements reviewed from today, pertinent positives to the presenting problem noted.    Physical Exam     ED Triage Vitals [24]   BP (!) 172/98   Pulse 76   Resp 18   Temp 97.5 °F (36.4 °C)   Temp src Temporal   SpO2 99 %   O2 Device None (Room air)       All measures to prevent infection transmission during my interaction with the patient were taken. Handwashing was performed prior to and after the exam.  Stethoscope and any equipment used during my examination was cleaned with super sani-cloth germicidal wipes following the exam.     Physical Exam  Vitals and nursing note reviewed.   Constitutional:       General: He is not in acute distress.     Appearance: He is well-developed.   HENT:      Head: Normocephalic and atraumatic.   Eyes:      General:         Right eye: No discharge.         Left eye: No discharge.      Conjunctiva/sclera: Conjunctivae normal.   Neck:      Trachea: No tracheal deviation.   Cardiovascular:       Rate and Rhythm: Normal rate.   Pulmonary:      Effort: Pulmonary effort is normal. No respiratory distress.      Breath sounds: No stridor.   Abdominal:      General: There is no distension.      Palpations: Abdomen is soft.      Tenderness: There is abdominal tenderness.      Comments: Diffuse tenderness, surgical incisions are clean dry and intact.  No bleeding or dehiscence.  Decreased but active bowel sounds.   Musculoskeletal:         General: No deformity.   Skin:     General: Skin is warm and dry.   Neurological:      Mental Status: He is alert and oriented to person, place, and time.   Psychiatric:         Mood and Affect: Mood normal.         Behavior: Behavior normal.         ED Course      Labs Reviewed - No data to display    As Interpreted by me    Imaging Results Available and Reviewed while in ED: XR ABDOMEN    IMPRESSION:  Nonspecific bowel gas pattern.  Formed stool seen in the descending colon and rectum.  Likely small left pleural effusion.  Gallstones.  CABG.  Postsurgical changes in the upper abdomen and bilateral groins.    Case faxed/finalized at 11:12 PM ET.  If there are any questions please contact me at [phone].        Se Vahid Flaherty MD  ED Medications Administered:   Medications   methylnaltrexone (Relistor) 12 mg/0.6mL SUBQ injection 12 mg (12 mg Subcutaneous Given 3/31/24 2218)         MDM     Vitals:    03/31/24 1945 03/31/24 2030 03/31/24 2115 03/31/24 2130   BP: (!) 172/98 (!) 150/93 (!) 148/96 159/77   Pulse: 76 70 65 72   Resp: 18 18  18   Temp: 97.5 °F (36.4 °C)      TempSrc: Temporal      SpO2: 99% 97% 97% 96%     *I personally reviewed and interpreted all ED vitals.    Pulse Ox: 96%, Room air, Normal     Monitor Interpretation:   normal sinus rhythm as interpreted by me.  The cardiac monitor was ordered to monitor heart rate.    Differential Diagnosis/ Diagnostic Considerations: Opiate-induced constipation, bowel obstruction, stool infection, other    Complicating Factors:  The patient already has does not have any pertinent problems on file. to contribute to the complexity of this ED evaluation.    Medical Decision Making  The patient presents to the ED with abdominal bloating and discomfort and constipation.  Nondistressed on examination and bowel sounds present.  X-ray without bowel obstruction or fecal impaction.  Patient was given Relistor in the ED and an enema and had a bowel movement and felt much improved following.  Stable for discharge with outpatient follow-up and return precautions.    Problems Addressed:  Constipation, unspecified constipation type: acute illness or injury    Amount and/or Complexity of Data Reviewed  Radiology: ordered and independent interpretation performed. Decision-making details documented in ED Course.     Details: I personally reviewed the patient's abdomen x-ray images and noted no bowel obstruction        Condition upon leaving the department: Stable    Disposition and Plan     Clinical Impression:  1. Constipation, unspecified constipation type        Disposition:  Discharge    Follow-up:  Sanjiv Sams MD  [address] [address]  [phone]    Schedule an appointment as soon as possible for a visit in 3 day(s)        Medications Prescribed:  Discharge Medication List as of 3/31/2024 10:48 PM

## 2024-04-01 NOTE — ED INITIAL ASSESSMENT (HPI)
Pt to ED for constipation post prostate removal, which was on Wednesday. Last BM was on Tuesday. Pt is feeling bloated with lots of gas. Is taking Tramadol every 6 hrs for pain, been taking stool softeners with no change.

## 2024-04-20 ENCOUNTER — APPOINTMENT (OUTPATIENT)
Dept: CT IMAGING | Facility: HOSPITAL | Age: 74
End: 2024-04-20
Attending: STUDENT IN AN ORGANIZED HEALTH CARE EDUCATION/TRAINING PROGRAM
Payer: MEDICARE

## 2024-04-20 ENCOUNTER — HOSPITAL ENCOUNTER (INPATIENT)
Facility: HOSPITAL | Age: 74
LOS: 2 days | Discharge: HOME | End: 2024-04-22
Attending: STUDENT IN AN ORGANIZED HEALTH CARE EDUCATION/TRAINING PROGRAM | Admitting: HOSPITALIST
Payer: MEDICARE

## 2024-04-20 DIAGNOSIS — N12 PYELONEPHRITIS: Primary | ICD-10-CM

## 2024-04-20 LAB
ALBUMIN SERPL-MCNC: 3.9 G/DL (ref 3.2–4.8)
ALBUMIN/GLOB SERPL: 1.3 {RATIO} (ref 1–2)
ALP LIVER SERPL-CCNC: 85 U/L
ALT SERPL-CCNC: 46 U/L
ANION GAP SERPL CALC-SCNC: 9 MMOL/L (ref 0–18)
AST SERPL-CCNC: 30 U/L (ref ?–34)
ATRIAL RATE: 71 BPM
BASOPHILS # BLD AUTO: 0.04 X10(3) UL (ref 0–0.2)
BASOPHILS NFR BLD AUTO: 0.2 %
BILIRUB SERPL-MCNC: 0.5 MG/DL (ref 0.2–1.1)
BILIRUB UR QL: NEGATIVE
BUN BLD-MCNC: 20 MG/DL (ref 9–23)
BUN/CREAT SERPL: 16.5 (ref 10–20)
CALCIUM BLD-MCNC: 8.6 MG/DL (ref 8.7–10.4)
CHLORIDE SERPL-SCNC: 103 MMOL/L (ref 98–112)
CO2 SERPL-SCNC: 24 MMOL/L (ref 21–32)
COLOR UR: YELLOW
CREAT BLD-MCNC: 1.21 MG/DL
DEPRECATED RDW RBC AUTO: 49.7 FL (ref 35.1–46.3)
EGFRCR SERPLBLD CKD-EPI 2021: 63 ML/MIN/1.73M2 (ref 60–?)
EOSINOPHIL # BLD AUTO: 0 X10(3) UL (ref 0–0.7)
EOSINOPHIL NFR BLD AUTO: 0 %
ERYTHROCYTE [DISTWIDTH] IN BLOOD BY AUTOMATED COUNT: 15.1 % (ref 11–15)
GLOBULIN PLAS-MCNC: 2.9 G/DL (ref 2.8–4.4)
GLUCOSE BLD-MCNC: 141 MG/DL (ref 70–99)
GLUCOSE BLDC GLUCOMTR-MCNC: 139 MG/DL (ref 70–99)
GLUCOSE UR-MCNC: NORMAL MG/DL
HCT VFR BLD AUTO: 25.5 %
HGB BLD-MCNC: 8.3 G/DL
IMM GRANULOCYTES # BLD AUTO: 0.33 X10(3) UL (ref 0–1)
IMM GRANULOCYTES NFR BLD: 1.9 %
IRON SATN MFR SERPL: 12 %
IRON SERPL-MCNC: 29 UG/DL
KETONES UR-MCNC: NEGATIVE MG/DL
LEUKOCYTE ESTERASE UR QL STRIP.AUTO: 500
LYMPHOCYTES # BLD AUTO: 0.63 X10(3) UL (ref 1–4)
LYMPHOCYTES NFR BLD AUTO: 3.6 %
MCH RBC QN AUTO: 29.6 PG (ref 26–34)
MCHC RBC AUTO-ENTMCNC: 32.5 G/DL (ref 31–37)
MCV RBC AUTO: 91.1 FL
MONOCYTES # BLD AUTO: 0.52 X10(3) UL (ref 0.1–1)
MONOCYTES NFR BLD AUTO: 3 %
NEUTROPHILS # BLD AUTO: 15.78 X10 (3) UL (ref 1.5–7.7)
NEUTROPHILS # BLD AUTO: 15.78 X10(3) UL (ref 1.5–7.7)
NEUTROPHILS NFR BLD AUTO: 91.3 %
NITRITE UR QL STRIP.AUTO: NEGATIVE
OSMOLALITY SERPL CALC.SUM OF ELEC: 287 MOSM/KG (ref 275–295)
P AXIS: 74 DEGREES
P-R INTERVAL: 142 MS
PH UR: 5.5 [PH] (ref 5–8)
PLATELET # BLD AUTO: 520 10(3)UL (ref 150–450)
POTASSIUM SERPL-SCNC: 4.4 MMOL/L (ref 3.5–5.1)
PROT SERPL-MCNC: 6.8 G/DL (ref 5.7–8.2)
PROT UR-MCNC: 50 MG/DL
Q-T INTERVAL: 458 MS
QRS DURATION: 80 MS
QTC CALCULATION (BEZET): 497 MS
R AXIS: 57 DEGREES
RBC # BLD AUTO: 2.8 X10(6)UL
RBC #/AREA URNS AUTO: >10 /HPF
SODIUM SERPL-SCNC: 136 MMOL/L (ref 136–145)
SP GR UR STRIP: 1.01 (ref 1–1.03)
T AXIS: 98 DEGREES
TIBC SERPL-MCNC: 237 UG/DL (ref 250–425)
TRANSFERRIN SERPL-MCNC: 159 MG/DL (ref 215–365)
UROBILINOGEN UR STRIP-ACNC: NORMAL
VENTRICULAR RATE: 71 BPM
WBC # BLD AUTO: 17.3 X10(3) UL (ref 4–11)
WBC #/AREA URNS AUTO: >50 /HPF
WBC CLUMPS UR QL AUTO: PRESENT /HPF

## 2024-04-20 PROCEDURE — 96376 TX/PRO/DX INJ SAME DRUG ADON: CPT

## 2024-04-20 PROCEDURE — 96375 TX/PRO/DX INJ NEW DRUG ADDON: CPT

## 2024-04-20 PROCEDURE — 99285 EMERGENCY DEPT VISIT HI MDM: CPT

## 2024-04-20 PROCEDURE — 81001 URINALYSIS AUTO W/SCOPE: CPT | Performed by: STUDENT IN AN ORGANIZED HEALTH CARE EDUCATION/TRAINING PROGRAM

## 2024-04-20 PROCEDURE — 83540 ASSAY OF IRON: CPT | Performed by: HOSPITALIST

## 2024-04-20 PROCEDURE — 84466 ASSAY OF TRANSFERRIN: CPT | Performed by: HOSPITALIST

## 2024-04-20 PROCEDURE — 96365 THER/PROPH/DIAG IV INF INIT: CPT

## 2024-04-20 PROCEDURE — 80053 COMPREHEN METABOLIC PANEL: CPT | Performed by: STUDENT IN AN ORGANIZED HEALTH CARE EDUCATION/TRAINING PROGRAM

## 2024-04-20 PROCEDURE — 87040 BLOOD CULTURE FOR BACTERIA: CPT | Performed by: HOSPITALIST

## 2024-04-20 PROCEDURE — 87086 URINE CULTURE/COLONY COUNT: CPT | Performed by: STUDENT IN AN ORGANIZED HEALTH CARE EDUCATION/TRAINING PROGRAM

## 2024-04-20 PROCEDURE — 93005 ELECTROCARDIOGRAM TRACING: CPT

## 2024-04-20 PROCEDURE — 93010 ELECTROCARDIOGRAM REPORT: CPT

## 2024-04-20 PROCEDURE — 74176 CT ABD & PELVIS W/O CONTRAST: CPT | Performed by: STUDENT IN AN ORGANIZED HEALTH CARE EDUCATION/TRAINING PROGRAM

## 2024-04-20 PROCEDURE — 96361 HYDRATE IV INFUSION ADD-ON: CPT

## 2024-04-20 PROCEDURE — 82962 GLUCOSE BLOOD TEST: CPT

## 2024-04-20 PROCEDURE — 85025 COMPLETE CBC W/AUTO DIFF WBC: CPT | Performed by: STUDENT IN AN ORGANIZED HEALTH CARE EDUCATION/TRAINING PROGRAM

## 2024-04-20 RX ORDER — DOCUSATE SODIUM 100 MG/1
100 CAPSULE, LIQUID FILLED ORAL 2 TIMES DAILY
Status: DISCONTINUED | OUTPATIENT
Start: 2024-04-20 | End: 2024-04-22

## 2024-04-20 RX ORDER — SODIUM CHLORIDE 9 MG/ML
INJECTION, SOLUTION INTRAVENOUS CONTINUOUS
Status: DISCONTINUED | OUTPATIENT
Start: 2024-04-20 | End: 2024-04-21

## 2024-04-20 RX ORDER — RAMIPRIL 10 MG/1
10 CAPSULE ORAL NIGHTLY
Status: DISCONTINUED | OUTPATIENT
Start: 2024-04-21 | End: 2024-04-22

## 2024-04-20 RX ORDER — ACETAMINOPHEN 500 MG
500 TABLET ORAL EVERY 4 HOURS PRN
Status: DISCONTINUED | OUTPATIENT
Start: 2024-04-20 | End: 2024-04-22

## 2024-04-20 RX ORDER — ESCITALOPRAM OXALATE 20 MG/1
20 TABLET ORAL EVERY MORNING
Status: DISCONTINUED | OUTPATIENT
Start: 2024-04-20 | End: 2024-04-22

## 2024-04-20 RX ORDER — MORPHINE SULFATE 4 MG/ML
4 INJECTION, SOLUTION INTRAMUSCULAR; INTRAVENOUS ONCE
Status: COMPLETED | OUTPATIENT
Start: 2024-04-20 | End: 2024-04-20

## 2024-04-20 RX ORDER — CLOPIDOGREL BISULFATE 75 MG/1
75 TABLET ORAL DAILY
Status: DISCONTINUED | OUTPATIENT
Start: 2024-04-20 | End: 2024-04-22

## 2024-04-20 RX ORDER — ATORVASTATIN CALCIUM 40 MG/1
40 TABLET, FILM COATED ORAL NIGHTLY
Status: DISCONTINUED | OUTPATIENT
Start: 2024-04-21 | End: 2024-04-22

## 2024-04-20 RX ORDER — ONDANSETRON 2 MG/ML
4 INJECTION INTRAMUSCULAR; INTRAVENOUS ONCE
Status: COMPLETED | OUTPATIENT
Start: 2024-04-20 | End: 2024-04-20

## 2024-04-20 RX ORDER — ASPIRIN 81 MG/1
81 TABLET ORAL DAILY
Status: DISCONTINUED | OUTPATIENT
Start: 2024-04-20 | End: 2024-04-22

## 2024-04-20 RX ORDER — TRAMADOL HYDROCHLORIDE 50 MG/1
50 TABLET ORAL EVERY 6 HOURS PRN
Status: DISCONTINUED | OUTPATIENT
Start: 2024-04-20 | End: 2024-04-22

## 2024-04-20 RX ORDER — ENOXAPARIN SODIUM 100 MG/ML
40 INJECTION SUBCUTANEOUS DAILY
Status: DISCONTINUED | OUTPATIENT
Start: 2024-04-20 | End: 2024-04-22

## 2024-04-20 RX ORDER — TAMSULOSIN HYDROCHLORIDE 0.4 MG/1
0.4 CAPSULE ORAL DAILY
Status: DISCONTINUED | OUTPATIENT
Start: 2024-04-20 | End: 2024-04-20

## 2024-04-20 NOTE — H&P
[facility] Hospitalist H&P       CC:   Chief Complaint   Patient presents with    Flank Pain        PCP: Sanjiv Sams MD    Date of Admission: 4/20/2024  1:52 AM    ASSESSMENT / PLAN:       Mr. Buck is a 72 yo M with PMH of CAD s/p CABG and stents, HTN, HL, prostate CA s/p robotic radial prostatectomy and pelvic LN diseection, bladder neck reconstruction  3/27/24, complicated by bladder leak, who presents with worsening flank pain.       Pyelonephritis  UTI  - continue abx  - f/u Ucx  - check BCx    Post op bladder leak  S/p prostatectomy  - continue champagne  - urology consulted    Leukocytosis  - likely 2/2 above  - continue ceftriaxone  - f/u Ucx  - check Bcx    Anemia  - Hg 9.2 on recent outpatient labs  - Hg 8.9 on admission  - monitor    CAD s/p CABG  - no cardiac symptoms  - hold ASA/plavix for now until urology evaluation  - due to follow-up with cardiology as outpatient for angiogram for abnormal stress test, not symptomatic at the time, noted during pre-op stress test    HTN  - BP stable  - hold home meds    HL  - statin    FN:  - IVF: NS  - Diet: cardiac     DVT Prophy: SCD, lovenox  Lines: PIV    Dispo: pending clinical course    Outpatient records or previous hospital records reviewed.     Further recommendations pending patient's clinical course.  [facility] hospitalist to continue to follow patient while in house    Patient and/or patient's family given opportunity to ask questions and note understanding and agreeing with therapeutic plan as outlined    Joelle Jordan MD  [facility] Hospitalist  Answering Service number: [phone]    HPI       History of Present Illness:      Mr. Buck is a 72 yo M with PMH of CAD s/p CABG and stents, HTN, HL, prostate CA s/p robotic radial prostatectomy and pelvic LN diseection, bladder neck reconstruction  3/27/24, complicated by bladder leak, who presents with worsening flank pain.     Patient initially had surgery March 27th with Dr. Omer for prostatectomy. He was seen in  the ER about 5 days later for abdominal bloating and constipation, improved with enema and discharged home. However he developed severe abdominal pain a few days later and was taken by ambulance to [place] where he was diagnosed with urethrovesical anastomotic leak, this was managed medically, did not need surgery. He was admitted 4/4-4/7. He had follow up with his urologist Dr. Omer on 4/11, champagne catheter exchanged at that time, recommended continue champagne for at least 2 more weeks with plans for repeat CT cystogram prior to removal of champagne.     In the ED, vitals stable. Labs with leukocytosis, Hg 8.3, . CT A/P with L sided perinephric stranding, perivesical standing. Fluid collection up to 10.cm, possible urinoma vs. Post op seroma. Urology consulted.         PMH  Past Medical History:    Anesthesia complication    Anxiety    BPH (benign prostatic hyperplasia)    Cold with flu    per pt: sore throat since 9/9/15; denies fever    CORONARY ARTERY DISEASE    CABG x 5 vessels    CORONARY ARTERY DISEASE    PTCA c stent  x 2    Depression    after CABG    Hearing impairment    hearing aids    High blood pressure    History of chest pain    Hx of motion sickness    Hypercholesterolemia    HYPERLIPIDEMIA    HYPERTENSION    Hypertension    Infectious disease    shingles x 1 incident    Osteoarthritis    OTHER DISEASES    chronic urticaria    OTHER DISEASES    + rectal polyp     Rectal polyp        PSH  Past Surgical History:   Procedure Laterality Date    Angioplasty (coronary)  04/2010    x 1 stent    Angioplasty (coronary)  2013    x 1 stent    Cabg  2002    x 5 vessels    Colonoscopy  2002     1 adenomatous polyp    Colonoscopy  07/2010    no polyps, repeat 5 yrs    Colonoscopy  11/2018    Knee arthroscopy Right 08/18/2017    Dr. Manriquez    Other surgical history  1980's    lipomectomy back    Other surgical history Right 1984    heel spur removed    Partial removal, clavicle Left 09/17/2015    Procedure:  SHOULDER OPEN ROTATOR CUFF REPAIR;  Surgeon: Leopoldo Manriquez MD;  Location: [facility]    Prostate surgery      Shldr arthroscop,part debride Left 2015    Procedure: SHOULDER OPEN ROTATOR CUFF REPAIR;  Surgeon: Leopoldo Manriquez MD;  Location: [facility]    Tonsillectomy      as child        ALL:  Allergies   Allergen Reactions    Other HIVES     Food - spices        Home Medications:  Outpatient Medications Marked as Taking for the 24 encounter (Hospital Encounter)   Medication Sig Dispense Refill    ramipril 10 MG Oral Cap TAKE 1 CAPSULE EVERY DAY FOR BLOOD PRESSURE MUST BE SEEN FOR ANY FURTHER REFILLS. (Patient taking differently: Take 1 capsule (10 mg total) by mouth at bedtime. TAKE 1 CAPSULE EVERY DAY FOR BLOOD PRESSURE MUST BE SEEN FOR ANY FURTHER REFILLS.) 90 capsule 0    escitalopram 20 MG Oral Tab TAKE 1 TABLET EVERY DAY FOR ANXIETY (NEED MD APPOINTMENT FOR REFILLS) 90 tablet 0    tamsulosin (FLOMAX) cap Take 1 capsule (0.4 mg total) by mouth daily. 90 capsule 1    atorvastatin 40 MG Oral Tab Take 1 tablet (40 mg total) by mouth As Directed.      Clopidogrel Bisulfate 75 MG Oral Tab Take 1 tablet (75 mg total) by mouth As Directed.      aspirin 81 MG Oral Tab Take 1 tablet (81 mg total) by mouth daily.           Soc Hx  Social History     Tobacco Use    Smoking status: Former     Current packs/day: 0.00     Types: Cigarettes     Quit date: 1980     Years since quittin.3    Smokeless tobacco: Never   Substance Use Topics    Alcohol use: No     Alcohol/week: 0.0 standard drinks of alcohol     Comment: one per year        Fam Hx  Family History   Problem Relation Age of Onset    Heart Disorder Father         MI    Diabetes Father     Other Mother         copd    Hypertension Mother     Hypertension Brother     Other Brother         murdered    Hypertension Brother        Review of Systems  Comprehensive ROS reviewed and negative except for what's stated above.     OBJECTIVE:  BP  128/66 (BP Location: Right arm)   Pulse 90   Temp 98 °F (36.7 °C) (Oral)   Resp 20   Ht 5' 9\" (1.753 m)   Wt 157 lb 3.2 oz (71.3 kg)   SpO2 99%   BMI 23.21 kg/m²     GEN: male in NAD  HEENT: EOMI  Pulm: CTAB, no crackles or wheezes  CV: RRR, no murmurs  ABD: Soft, mild TTP, mildly distended, +BS  MSK: L flank pain  SKIN: warm, dry  EXT: no edema    Diagnostic Data:    CBC/Chem    Recent Labs   Lab 04/20/24 0229   RBC 2.80*   HGB 8.3*   HCT 25.5*   MCV 91.1   MCH 29.6   MCHC 32.5   RDW 15.1*   NEPRELIM 15.78*   WBC 17.3*   .0*         Recent Labs   Lab 04/20/24 0229   *   BUN 20   CREATSERUM 1.21   EGFRCR 63   CA 8.6*      K 4.4      CO2 24.0     Lab Results   Component Value Date    WBC 17.3 04/20/2024    HGB 8.3 04/20/2024    HCT 25.5 04/20/2024    .0 04/20/2024    CREATSERUM 1.21 04/20/2024    BUN 20 04/20/2024     04/20/2024    K 4.4 04/20/2024     04/20/2024    CO2 24.0 04/20/2024     04/20/2024    CA 8.6 04/20/2024    ALB 3.9 04/20/2024    ALKPHO 85 04/20/2024    BILT 0.5 04/20/2024    TP 6.8 04/20/2024    AST 30 04/20/2024    ALT 46 04/20/2024       No results for input(s): \"TROP\" in the last 168 hours.    Additional Diagnostics:      Radiology: CT ABDOMEN+PELVIS KIDNEYSTONE 2D RNDR(NO IV,NO ORAL)(CPT=74176)    Result Date: 4/20/2024  CONCLUSION:  1. Despite the placement of a Garces catheter, there perivesical fat stranding, which could reflect cystitis in the appropriate clinical setting.  2. There is diffuse left hydroureter and moderate left-sided perinephric and periureteric fat stranding, which may relate to obstructive uropathy, but correlation with urinalysis is recommended to ascertain for potential superimposed ascending urinary tract infection of the left collecting system/pyelonephritis.  3. A nonspecific fluid collection is demonstrated in the space of Retzius, measuring up to 10.4 cm. This could reflect a urinoma in the appropriate  clinical setting or, potentially, postoperative seroma, with or without superimposed infection.  4. Postoperative changes of prostatectomy with additional phlegmonous fluid in the operative bed.  5. A punctate focus of gas within adjacent fluid may reflect sequela of operative intervention.  6. Cholelithiasis without CT evidence acute complication.  7. Periampullary duodenal diverticulum without CT evidence acute complication.   8. Small volume intraperitoneal ascites.  9. Partially visualized postthoracotomy chest with calcified pleural plaques, which may reflect prior specific exposure.  10. Low-density appearance of the intracardiac blood pool raises the possibility of underlying anemia. Correlate with hematologic parameters.  11. Lesser incidental findings as above.    A preliminary report was issued by the [facility] teleradiology service. There are no major discrepancies.   Dictated by (CST): Ky Ojeda MD on 4/20/2024 at 6:58 AM     Finalized by (CST): Ky Ojeda MD on 4/20/2024 at 7:10 AM

## 2024-04-20 NOTE — ED INITIAL ASSESSMENT (HPI)
Pt presents to the Ed with c/o left flank pain w/ many episodes of emesis. Pt with champagne catheter due to prostate surgery March 2024. Pt had an episode of unresponsiveness in the waiting room. History of anemia.

## 2024-04-20 NOTE — PLAN OF CARE
Problem: Patient Centered Care  Goal: Patient preferences are identified and integrated in the patient's plan of care  Description: Interventions:  - What would you like us to know as we care for you?   - Provide timely, complete, and accurate information to patient/family  - Incorporate patient and family knowledge, values, beliefs, and cultural backgrounds into the planning and delivery of care  - Encourage patient/family to participate in care and decision-making at the level they choose  - Honor patient and family perspectives and choices  Outcome: Progressing     Problem: PAIN - ADULT  Goal: Verbalizes/displays adequate comfort level or patient's stated pain goal  Description: INTERVENTIONS:  - Encourage pt to monitor pain and request assistance  - Assess pain using appropriate pain scale  - Administer analgesics based on type and severity of pain and evaluate response  - Implement non-pharmacological measures as appropriate and evaluate response  - Consider cultural and social influences on pain and pain management  - Manage/alleviate anxiety  - Utilize distraction and/or relaxation techniques  - Monitor for opioid side effects  - Notify MD/LIP if interventions unsuccessful or patient reports new pain  - Anticipate increased pain with activity and pre-medicate as appropriate  Outcome: Progressing     Problem: SAFETY ADULT - FALL  Goal: Free from fall injury  Description: INTERVENTIONS:  - Assess pt frequently for physical needs  - Identify cognitive and physical deficits and behaviors that affect risk of falls.  - [place] fall precautions as indicated by assessment.  - Educate pt/family on patient safety including physical limitations  - Instruct pt to call for assistance with activity based on assessment  - Modify environment to reduce risk of injury  - Provide assistive devices as appropriate  - Consider OT/PT consult to assist with strengthening/mobility  - Encourage toileting schedule  Outcome:  Elieser Mckeon was admitted from ED with c/o left flank pain. Pt  stated having some pain relief after PRN pain  meds given in ED. IVF infusing at this time. Home meds reconciled by MD. New orders carried out.

## 2024-04-20 NOTE — ED QUICK NOTES
Orders for admission, patient is aware of plan and ready to go upstairs. Any questions, please call ED RN Ca at extension [phone].     Patient Covid vaccination status: Fully vaccinated     COVID Test Ordered in ED: None    COVID Suspicion at Admission: N/A    Running Infusions:  None    Mental Status/LOC at time of transport: A&OX4    Other pertinent information:   CIWA score: N/A   NIH score:  N/A

## 2024-04-20 NOTE — PLAN OF CARE
Patient is A&Ox4. VSS. RA. Up x1 assist and walker. Garces is in place. Urologist saw patient today. Plan is to continue IV rocephin. NS running at 100cc/hr. Lovenox for VTE Prophylaxis. PRN tramadol administered for pain control. Call light is in place.   Problem: Patient Centered Care  Goal: Patient preferences are identified and integrated in the patient's plan of care  Description: Interventions:  - What would you like us to know as we care for you?   - Provide timely, complete, and accurate information to patient/family  - Incorporate patient and family knowledge, values, beliefs, and cultural backgrounds into the planning and delivery of care  - Encourage patient/family to participate in care and decision-making at the level they choose  - Honor patient and family perspectives and choices  Outcome: Progressing     Problem: Patient/Family Goals  Goal: Patient/Family Long Term Goal  Description: Patient's Long Term Goal:    Interventions:  - See additional Care Plan goals for specific interventions  Outcome: Progressing  Goal: Patient/Family Short Term Goal  Description: Patient's Short Term Goal:     Interventions:   - See additional Care Plan goals for specific interventions  Outcome: Progressing     Problem: PAIN - ADULT  Goal: Verbalizes/displays adequate comfort level or patient's stated pain goal  Description: INTERVENTIONS:  - Encourage pt to monitor pain and request assistance  - Assess pain using appropriate pain scale  - Administer analgesics based on type and severity of pain and evaluate response  - Implement non-pharmacological measures as appropriate and evaluate response  - Consider cultural and social influences on pain and pain management  - Manage/alleviate anxiety  - Utilize distraction and/or relaxation techniques  - Monitor for opioid side effects  - Notify MD/LIP if interventions unsuccessful or patient reports new pain  - Anticipate increased pain with activity and pre-medicate as  appropriate  Outcome: Progressing     Problem: SAFETY ADULT - FALL  Goal: Free from fall injury  Description: INTERVENTIONS:  - Assess pt frequently for physical needs  - Identify cognitive and physical deficits and behaviors that affect risk of falls.  - [place] fall precautions as indicated by assessment.  - Educate pt/family on patient safety including physical limitations  - Instruct pt to call for assistance with activity based on assessment  - Modify environment to reduce risk of injury  - Provide assistive devices as appropriate  - Consider OT/PT consult to assist with strengthening/mobility  - Encourage toileting schedule  Outcome: Progressing     Problem: Patient Centered Care  Goal: Patient preferences are identified and integrated in the patient's plan of care  Description: Interventions:  - What would you like us to know as we care for you?   - Provide timely, complete, and accurate information to patient/family  - Incorporate patient and family knowledge, values, beliefs, and cultural backgrounds into the planning and delivery of care  - Encourage patient/family to participate in care and decision-making at the level they choose  - Honor patient and family perspectives and choices  Outcome: Progressing     Problem: Patient/Family Goals  Goal: Patient/Family Long Term Goal  Description: Patient's Long Term Goal:     Interventions:  - See additional Care Plan goals for specific interventions  Outcome: Progressing  Goal: Patient/Family Short Term Goal  Description: Patient's Short Term Goal:     Interventions:   - See additional Care Plan goals for specific interventions  Outcome: Progressing     Problem: PAIN - ADULT  Goal: Verbalizes/displays adequate comfort level or patient's stated pain goal  Description: INTERVENTIONS:  - Encourage pt to monitor pain and request assistance  - Assess pain using appropriate pain scale  - Administer analgesics based on type and severity of pain and evaluate  response  - Implement non-pharmacological measures as appropriate and evaluate response  - Consider cultural and social influences on pain and pain management  - Manage/alleviate anxiety  - Utilize distraction and/or relaxation techniques  - Monitor for opioid side effects  - Notify MD/LIP if interventions unsuccessful or patient reports new pain  - Anticipate increased pain with activity and pre-medicate as appropriate  Outcome: Progressing     Problem: SAFETY ADULT - FALL  Goal: Free from fall injury  Description: INTERVENTIONS:  - Assess pt frequently for physical needs  - Identify cognitive and physical deficits and behaviors that affect risk of falls.  - [place] fall precautions as indicated by assessment.  - Educate pt/family on patient safety including physical limitations  - Instruct pt to call for assistance with activity based on assessment  - Modify environment to reduce risk of injury  - Provide assistive devices as appropriate  - Consider OT/PT consult to assist with strengthening/mobility  - Encourage toileting schedule  Outcome: Progressing

## 2024-04-20 NOTE — CONSULTS
[facility]  part of [facility]    Consultation    Mike Buck Patient Status:  Inpatient    1950 MRN [id number]   Location [facility] 4W/SW/SE Attending Joelle Jordan MD   Hosp Day # 0 PCP Sanjiv Sams MD     History of Present Illness:  Mike Buck is a(n) 73 year old male. He has had a radical prostatectomy 3-. He has had s complicated post-op course: BN leak, ileus and now possible L pyelonephritis.he is feeling better since being in the hospital.     History:  Past Medical History:    Anesthesia complication    Anxiety    BPH (benign prostatic hyperplasia)    Cold with flu    per pt: sore throat since 9/9/15; denies fever    CORONARY ARTERY DISEASE    CABG x 5 vessels    CORONARY ARTERY DISEASE    PTCA c stent  x 2    Depression    after CABG    Hearing impairment    hearing aids    High blood pressure    History of chest pain    Hx of motion sickness    Hypercholesterolemia    HYPERLIPIDEMIA    HYPERTENSION    Hypertension    Infectious disease    shingles x 1 incident    Osteoarthritis    OTHER DISEASES    chronic urticaria    OTHER DISEASES    + rectal polyp     Rectal polyp     Past Surgical History:   Procedure Laterality Date    Angioplasty (coronary)  04/2010    x 1 stent    Angioplasty (coronary)  2013    x 1 stent    Cabg  2002    x 5 vessels    Colonoscopy       1 adenomatous polyp    Colonoscopy  2010    no polyps, repeat 5 yrs    Colonoscopy  2018    Knee arthroscopy Right 2017    Dr. Manriquez    Other surgical history      lipomectomy back    Other surgical history Right     heel spur removed    Partial removal, clavicle Left 2015    Procedure: SHOULDER OPEN ROTATOR CUFF REPAIR;  Surgeon: Leopoldo Manriquez MD;  Location: [facility]    Prostate surgery      Shldr arthroscop,part debride Left 2015    Procedure: SHOULDER OPEN ROTATOR CUFF REPAIR;  Surgeon: Leopoldo Manriquez MD;  Location: [facility]     Tonsillectomy      as child     Family History   Problem Relation Age of Onset    Heart Disorder Father         MI    Diabetes Father     Other Mother         copd    Hypertension Mother     Hypertension Brother     Other Brother         murdered    Hypertension Brother       reports that he quit smoking about 44 years ago. His smoking use included cigarettes. He has never used smokeless tobacco. He reports that he does not drink alcohol and does not use drugs.    Allergies:  Allergies   Allergen Reactions    Other HIVES     Food - spices       Home Medications:  No current outpatient medications on file.       Review of Systems:  Pertinent items are noted in HPI.    Physical Exam:  /65 (BP Location: Left arm)   Pulse 90   Temp 97.8 °F (36.6 °C) (Oral)   Resp 16   Ht 5' 9\" (1.753 m)   Wt 157 lb 3.2 oz (71.3 kg)   SpO2 95%   BMI 23.21 kg/m²     General Appearance: Alert, cooperative, no distress, appears stated age  Head: Normocephalic, without obvious abnormality, atraumatic  Lungs: Clear to auscultation bilaterally, respirations unlabored  Abdomen: Soft, non-tender, bowel sounds active all four quadrants, no masses,  no organomegaly-incisions intact, SP bruising  Genitalia: male without lesions, discharge or tenderness-champagne-clear urine    Laboratory Data:   Lab Results   Component Value Date    WBC 17.3 04/20/2024    HGB 8.3 04/20/2024    HCT 25.5 04/20/2024    .0 04/20/2024    CREATSERUM 1.21 04/20/2024    BUN 20 04/20/2024     04/20/2024    K 4.4 04/20/2024     04/20/2024    CO2 24.0 04/20/2024     04/20/2024    CA 8.6 04/20/2024    ALB 3.9 04/20/2024    ALKPHO 85 04/20/2024    BILT 0.5 04/20/2024    TP 6.8 04/20/2024    AST 30 04/20/2024    ALT 46 04/20/2024    PGLU 139 04/20/2024     Lab Results   Component Value Date    COLORUR Yellow 04/20/2024    CLARITY Ex.Turbid 04/20/2024    SPECGRAVITY 1.014 04/20/2024    GLUUR Normal 04/20/2024    BILUR Negative 04/20/2024     KETUR Negative 04/20/2024    BLOODURINE 3+ 04/20/2024    PHURINE 5.5 04/20/2024    PROUR 50 04/20/2024    UROBILINOGEN Normal 04/20/2024    NITRITE Negative 04/20/2024    LEUUR 500 04/20/2024       Imaging:  mpression   CONCLUSION:  1. Despite the placement of a Champagne catheter, there perivesical fat stranding, which could reflect cystitis in the appropriate clinical setting.     2. There is diffuse left hydroureter and moderate left-sided perinephric and periureteric fat stranding, which may relate to obstructive uropathy, but correlation with urinalysis is recommended to ascertain for potential superimposed ascending urinary  tract infection of the left collecting system/pyelonephritis.     3. A nonspecific fluid collection is demonstrated in the space of Retzius, measuring up to 10.4 cm. This could reflect a urinoma in the appropriate clinical setting or, potentially, postoperative seroma, with or without superimposed infection.     4. Postoperative changes of prostatectomy with additional phlegmonous fluid in the operative bed.     5. A punctate focus of gas within adjacent fluid may reflect sequela of operative intervention.     6. Cholelithiasis without CT evidence acute complication.     7. Periampullary duodenal diverticulum without CT evidence acute complication.       8. Small volume intraperitoneal ascites.     9. Partially visualized postthoracotomy chest with calcified pleural plaques, which may reflect prior specific exposure.     10. Low-density appearance of the intracardiac blood pool raises the possibility of underlying anemia. Correlate with hematologic parameters.     11. Lesser incidental findings as above.       Assessment:    BN Leak  Prostate Ca  ? L Pyelonephritis    Plan:  -champagne  -ambulate  -antibiotics  -await culture  -possible CT Urogram soon  -trend WBC    Johnjan Roshan Arango MD  4/20/2024  11:30 AM

## 2024-04-20 NOTE — ED PROVIDER NOTES
[place] Emergency Department Note  Patient: Mike Buck Age: 73 year old Sex: male      MRN: [id number]  : 1950    Patient Seen in: [facility] Emergency Department    History     Chief Complaint   Patient presents with    Flank Pain     Stated Complaint: Flank pain    History obtained from: Patient and patient's wife    73-year-old male with past medical history of hypertension, hyperlipidemia, CAD status post CABG, prostate cancer status post robot-assisted radical prostatectomy with bilateral pelvic lymph node dissection and bladder neck reconstruction on 3/27/2024 with Dr. Omer complicated by bladder leak that was managed nonoperatively at [facility] presenting for evaluation of left-sided flank pain.  He states that starting this evening, he began having sharp pain in his left flank.  Pain has been constant since its onset.  Does not radiate.  Associated nausea and vomiting but no fevers.  Has an indwelling Garces in place since his surgery    Review of Systems:  Review of Systems  Positive for stated complaint: seizure. Constitutional and vital signs reviewed. All other systems reviewed and negative except as noted above.    Patient History:  Past Medical History:    Anesthesia complication    Anxiety    BPH (benign prostatic hyperplasia)    Cold with flu    per pt: sore throat since 9/9/15; denies fever    CORONARY ARTERY DISEASE    CABG x 5 vessels    CORONARY ARTERY DISEASE    PTCA c stent  x 2    Depression    after CABG    Hearing impairment    hearing aids    High blood pressure    History of chest pain    Hx of motion sickness    Hypercholesterolemia    HYPERLIPIDEMIA    HYPERTENSION    Hypertension    Infectious disease    shingles x 1 incident    Osteoarthritis    OTHER DISEASES    chronic urticaria    OTHER DISEASES    + rectal polyp     Rectal polyp       Past Surgical History:   Procedure Laterality Date    Angioplasty (coronary)  04/2010    x 1 stent    Angioplasty  (coronary)  2013    x 1 stent    Cabg  2002    x 5 vessels    Colonoscopy       1 adenomatous polyp    Colonoscopy  2010    no polyps, repeat 5 yrs    Colonoscopy  2018    Knee arthroscopy Right 2017    Dr. Manriquez    Other surgical history      lipomectomy back    Other surgical history Right     heel spur removed    Partial removal, clavicle Left 2015    Procedure: SHOULDER OPEN ROTATOR CUFF REPAIR;  Surgeon: Leopoldo Manriquez MD;  Location: [facility]    Prostate surgery      Shldr arthroscop,part debride Left 2015    Procedure: SHOULDER OPEN ROTATOR CUFF REPAIR;  Surgeon: Leopoldo Manriquez MD;  Location: [facility]    Tonsillectomy      as child        Family History   Problem Relation Age of Onset    Heart Disorder Father         MI    Diabetes Father     Other Mother         copd    Hypertension Mother     Hypertension Brother     Other Brother         murdered    Hypertension Brother        Specific Social Determinants of Health:   Social History     Socioeconomic History    Marital status:     Number of children: 3   Occupational History    Occupation: -retired   Tobacco Use    Smoking status: Former     Current packs/day: 0.00     Types: Cigarettes     Quit date: 1980     Years since quittin.3    Smokeless tobacco: Never   Vaping Use    Vaping status: Never Used   Substance and Sexual Activity    Alcohol use: No     Alcohol/week: 0.0 standard drinks of alcohol     Comment: one per year    Drug use: No           PSFH elements reviewed from today and agreed except as otherwise stated in HPI.    Physical Exam     ED Triage Vitals   BP 24 0134 138/59   Pulse 24 0155 79   Resp 24 0134 18   Temp 24 0134 97.7 °F (36.5 °C)   Temp src 24 0503 Oral   SpO2 24 0155 97 %   O2 Device 24 0155 None (Room air)       Current:/66 (BP Location: Right arm)   Pulse 77   Temp 97.7 °F (36.5 °C)   Resp 20    Ht 175.3 cm (5' 9\")   Wt 71.3 kg   SpO2 99%   BMI 23.21 kg/m²         Physical Exam  Constitutional:       Appearance: He is well-developed.   HENT:      Head: Normocephalic and atraumatic.      Right Ear: External ear normal.      Left Ear: External ear normal.      Nose: Nose normal.   Eyes:      Conjunctiva/sclera: Conjunctivae normal.      Pupils: Pupils are equal, round, and reactive to light.   Cardiovascular:      Rate and Rhythm: Normal rate and regular rhythm.      Heart sounds: Normal heart sounds.   Pulmonary:      Effort: Pulmonary effort is normal.      Breath sounds: Normal breath sounds.   Abdominal:      General: Bowel sounds are normal.      Palpations: Abdomen is soft.      Tenderness: There is no abdominal tenderness.   Musculoskeletal:         General: Normal range of motion.      Cervical back: Normal range of motion and neck supple.   Skin:     General: Skin is warm and dry.      Findings: No rash.   Neurological:      General: No focal deficit present.      Mental Status: He is alert and oriented to person, place, and time.      Deep Tendon Reflexes: Reflexes are normal and symmetric.   Psychiatric:         Mood and Affect: Mood normal.         Behavior: Behavior normal.         ED Course   Labs:   Labs Reviewed   COMP METABOLIC PANEL (14) - Abnormal; Notable for the following components:       Result Value    Glucose 141 (*)     Calcium, Total 8.6 (*)     All other components within normal limits   URINALYSIS WITH CULTURE REFLEX - Abnormal; Notable for the following components:    Clarity Urine Ex.Turbid (*)     Blood Urine 3+ (*)     Protein Urine 50 (*)     Leukocyte Esterase Urine 500 (*)     WBC Urine >50 (*)     RBC Urine >10 (*)     WBC Clump Present (*)     All other components within normal limits   POCT GLUCOSE - Abnormal; Notable for the following components:    POC Glucose  139 (*)     All other components within normal limits   CBC W/ DIFFERENTIAL - Abnormal; Notable for the  following components:    WBC 17.3 (*)     RBC 2.80 (*)     HGB 8.3 (*)     HCT 25.5 (*)     RDW-SD 49.7 (*)     RDW 15.1 (*)     .0 (*)     Neutrophil Absolute Prelim 15.78 (*)     Neutrophil Absolute 15.78 (*)     Lymphocyte Absolute 0.63 (*)     All other components within normal limits   CBC WITH DIFFERENTIAL WITH PLATELET    Narrative:     The following orders were created for panel order CBC With Differential With Platelet.  Procedure                               Abnormality         Status                     ---------                               -----------         ------                     CBC W/ DIFFERENTIAL[[id number]]          Abnormal            Final result                 Please view results for these tests on the individual orders.   URINE CULTURE, ROUTINE     Radiology findings:  I personally reviewed the images.   No results found.    EKG as interpreted by me: Regular rate 71, normal sinus rhythm, normal axis, no SC interval prolongation, narrow QRS, QTc 497 ms, no ST segment elevations or depressions, no abnormal T wave inversions.  Cardiac Monitor: Interpreted by me.   Pulse Readings from Last 1 Encounters:   04/20/24 77   , sinus,     External non-ED records reviewed independently by me: Discharge summary from 4/7/2024 confirms patient was admitted to [facility] for abdominal distention.  During hospitalization, had a CT abdomen pelvis performed on 4/5/2024 that showed a focus of fluid collection posterior to the bladder.  CT cystogram showed a large volume of extravasated contrast in the abdominal cavity likely secondary to leak at the level of the bladder neck posterior laterally at surgical site.    MDM   73-year-old male with a past medical history of hypertension, hyperlipidemia, CAD status post CABG, prostate cancer status post robot-assisted radical prostatectomy with bilateral pelvic lymph node dissection and bladder neck reconstruction on 3/27/2024 with Dr. Omer  complicated by bladder leak that was managed nonoperatively at [facility] presenting for evaluation of left-sided flank pain.    Differential diagnoses considered includes, but is not limited to: Nephrolithiasis, pyelonephritis, retroperitoneal hematoma,    Will obtain the following tests: CBC, CMP, urinalysis, CT abdomen pelvis kidney stone protocol, EKG  Please see ED course for my independent review of these tests/imaging results.    Initial Medications/Therapeutics administered: IV fluids, morphine, Zofran    Chronic conditions affecting care: As above    Workup and medications considered but not ordered: Considered lactate and blood cultures however patient has normal vital signs.  No evidence of sepsis    Social Determinants of Health that impacted care: None    ED Course as of 04/20/24 0514  ------------------------------------------------------------  Time: 04/20 0346  Comment: Independent reviewed the CT kidney stone protocol images that show no evidence of visualized kidney stone.  Agree with radiology read above which notes left-sided pyelonephritis.  Labs with WBC of 17.3 consistent with infectious etiology.  Urinalysis with borderline urinary tract infection.  Recent urine culture showed no growth.  No evidence of drug-resistant bacteria.  Start antibiotics with Rocephin.  I endorsed the patient's case with the [facility] hospitalist accepted the patient for admission for further IV antibiotics.  Also discussed with urology, Dr. Arango, who was made aware of new consult.          Disposition and Plan     Clinical Impression:  1. Pyelonephritis        Disposition:  Admit    Follow-up:  No follow-up provider specified.    Medications Prescribed:  Current Discharge Medication List          Hospital Problems       Present on Admission  Date Reviewed: 12/20/2021            ICD-10-CM Noted POA    * (Principal) Pyelonephritis N12 4/20/2024 Unknown        This note may have been created using voice dictation  technology and may include inadvertent errors.      Janet MD [place]  Emergency Medicine

## 2024-04-21 LAB
ANION GAP SERPL CALC-SCNC: 5 MMOL/L (ref 0–18)
BUN BLD-MCNC: 11 MG/DL (ref 9–23)
BUN/CREAT SERPL: 12.8 (ref 10–20)
CALCIUM BLD-MCNC: 8.1 MG/DL (ref 8.7–10.4)
CHLORIDE SERPL-SCNC: 109 MMOL/L (ref 98–112)
CO2 SERPL-SCNC: 25 MMOL/L (ref 21–32)
CREAT BLD-MCNC: 0.86 MG/DL
DEPRECATED RDW RBC AUTO: 48.9 FL (ref 35.1–46.3)
EGFRCR SERPLBLD CKD-EPI 2021: 91 ML/MIN/1.73M2 (ref 60–?)
ERYTHROCYTE [DISTWIDTH] IN BLOOD BY AUTOMATED COUNT: 15 % (ref 11–15)
GLUCOSE BLD-MCNC: 87 MG/DL (ref 70–99)
HCT VFR BLD AUTO: 21.9 %
HGB BLD-MCNC: 7.1 G/DL
HGB BLD-MCNC: 7.5 G/DL
MAGNESIUM SERPL-MCNC: 2.1 MG/DL (ref 1.6–2.6)
MCH RBC QN AUTO: 29.3 PG (ref 26–34)
MCHC RBC AUTO-ENTMCNC: 32.4 G/DL (ref 31–37)
MCV RBC AUTO: 90.5 FL
OSMOLALITY SERPL CALC.SUM OF ELEC: 287 MOSM/KG (ref 275–295)
PLATELET # BLD AUTO: 431 10(3)UL (ref 150–450)
POTASSIUM SERPL-SCNC: 4.2 MMOL/L (ref 3.5–5.1)
RBC # BLD AUTO: 2.42 X10(6)UL
SODIUM SERPL-SCNC: 139 MMOL/L (ref 136–145)
WBC # BLD AUTO: 8.8 X10(3) UL (ref 4–11)

## 2024-04-21 PROCEDURE — 85018 HEMOGLOBIN: CPT | Performed by: HOSPITALIST

## 2024-04-21 PROCEDURE — 80048 BASIC METABOLIC PNL TOTAL CA: CPT | Performed by: HOSPITALIST

## 2024-04-21 PROCEDURE — 83735 ASSAY OF MAGNESIUM: CPT | Performed by: HOSPITALIST

## 2024-04-21 PROCEDURE — 85027 COMPLETE CBC AUTOMATED: CPT | Performed by: HOSPITALIST

## 2024-04-21 NOTE — PROGRESS NOTES
[facility] Hospitalist Progress Note     CC: Hospital Follow up    PCP: Sanjiv Sams MD       Assessment/Plan:     Principal Problem:    Pyelonephritis      Mr. Buck is a 72 yo M with PMH of CAD s/p CABG and stents, HTN, HL, prostate CA s/p robotic radial prostatectomy and pelvic LN diseection, bladder neck reconstruction  3/27/24, complicated by bladder leak, who presents with worsening flank pain.         Pyelonephritis  UTI  - continue abx  - f/u Ucx  - check BCx    Acute on chronic Anemia  - Hg 9.2 on recent outpatient labs  - Hg 8.9 on admission  - down to 7.1 this AM, no evidence of bleeding, repeat Hg today  - hold blood thinners     Post op bladder leak  S/p prostatectomy  - continue champagne  - urology consulted     Leukocytosis- improving  - likely 2/2 above  - continue ceftriaxone  - f/u Ucx  - check Bcx     CAD s/p CABG  - no cardiac symptoms  - hold ASA/plavix for now until urology evaluation  - due to follow-up with cardiology as outpatient for angiogram for abnormal stress test, not symptomatic at the time, noted during pre-op stress test     HTN  - BP stable  - hold home meds     HL  - statin     FN:  - IVF: stop IVF  - Diet: cardiac      DVT Prophy: SCD, lovenox  Lines: PIV     Dispo: pending clinical course     Outpatient records or previous hospital records reviewed.      Further recommendations pending patient's clinical course.  [facility] hospitalist to continue to follow patient while in house     Patient and/or patient's family given opportunity to ask questions and note understanding and agreeing with therapeutic plan as outlined     Joelle Jordan MD  [facility] Hospitalist  Answering Service number: [phone]     Subjective:     Feels better today, afebrile overnight. Hg 7 today    OBJECTIVE:    Blood pressure 157/71, pulse 68, temperature 97.7 °F (36.5 °C), temperature source Oral, resp. rate 18, height 5' 9\" (1.753 m), weight 157 lb 3.2 oz (71.3 kg), SpO2 99%.    Temp:  [97.7 °F (36.5 °C)-98.5 °F  (36.9 °C)] 97.7 °F (36.5 °C)  Pulse:  [68-70] 68  Resp:  [16-18] 18  BP: (115-157)/(57-71) 157/71  SpO2:  [98 %-99 %] 99 %      Intake/Output:    Intake/Output Summary (Last 24 hours) at 4/21/2024 1205  Last data filed at 4/21/2024 1053  Gross per 24 hour   Intake 1280 ml   Output 2450 ml   Net -1170 ml       Last 3 Weights   04/20/24 0508 157 lb 3.2 oz (71.3 kg)   04/20/24 0134 160 lb (72.6 kg)   03/21/24 1730 180 lb (81.6 kg)   12/20/21 0740 180 lb (81.6 kg)       Exam   GEN: male in NAD  HEENT: EOMI  Pulm: CTAB, no crackles or wheezes  CV: RRR, no murmurs  ABD: Soft, mild TTP, mildly distended, +BS  MSK: L flank pain  SKIN: warm, dry, mild bruising of lower abdomen present on admission  EXT: no edema    Data Review:       Labs:     Recent Labs   Lab 04/20/24 0229 04/21/24 0622   RBC 2.80* 2.42*   HGB 8.3* 7.1*   HCT 25.5* 21.9*   MCV 91.1 90.5   MCH 29.6 29.3   MCHC 32.5 32.4   RDW 15.1* 15.0   NEPRELIM 15.78*  --    WBC 17.3* 8.8   .0* 431.0         Recent Labs   Lab 04/20/24 0229 04/21/24  0622   * 87   BUN 20 11   CREATSERUM 1.21 0.86   EGFRCR 63 91   CA 8.6* 8.1*    139   K 4.4 4.2    109   CO2 24.0 25.0       Recent Labs   Lab 04/20/24 0229   ALT 46   AST 30   ALB 3.9         Imaging:  CT ABDOMEN+PELVIS KIDNEYSTONE 2D RNDR(NO IV,NO ORAL)(CPT=74176)    Result Date: 4/20/2024  CONCLUSION:  1. Despite the placement of a Garces catheter, there perivesical fat stranding, which could reflect cystitis in the appropriate clinical setting.  2. There is diffuse left hydroureter and moderate left-sided perinephric and periureteric fat stranding, which may relate to obstructive uropathy, but correlation with urinalysis is recommended to ascertain for potential superimposed ascending urinary tract infection of the left collecting system/pyelonephritis.  3. A nonspecific fluid collection is demonstrated in the space of Retzius, measuring up to 10.4 cm. This could reflect a urinoma in the  appropriate clinical setting or, potentially, postoperative seroma, with or without superimposed infection.  4. Postoperative changes of prostatectomy with additional phlegmonous fluid in the operative bed.  5. A punctate focus of gas within adjacent fluid may reflect sequela of operative intervention.  6. Cholelithiasis without CT evidence acute complication.  7. Periampullary duodenal diverticulum without CT evidence acute complication.   8. Small volume intraperitoneal ascites.  9. Partially visualized postthoracotomy chest with calcified pleural plaques, which may reflect prior specific exposure.  10. Low-density appearance of the intracardiac blood pool raises the possibility of underlying anemia. Correlate with hematologic parameters.  11. Lesser incidental findings as above.    A preliminary report was issued by the [employer] [facility] teleradiology service. There are no major discrepancies.   Dictated by (CST): Ky Ojeda MD on 4/20/2024 at 6:58 AM     Finalized by (CST): Ky Ojeda MD on 4/20/2024 at 7:10 AM             Meds:     INPATIENT MEDICATIONS    Scheduled Medications:      atorvastatin, 40 mg, Nightly  escitalopram, 20 mg, QAM  [Held by provider] ramipril, 10 mg, Nightly  [Held by provider] enoxaparin, 40 mg, Daily  cefTRIAXone, 1 g, Q24H  [Held by provider] aspirin, 81 mg, Daily  [Held by provider] clopidogrel, 75 mg, Daily  docusate sodium, 100 mg, BID            Drips:       PRN Medications  acetaminophen, 500 mg, Q4H PRN  traMADol, 50 mg, Q6H PRN

## 2024-04-21 NOTE — PLAN OF CARE
Mike is ambulating in room with stand-by assist. Garces patent and draining clear yellow urine. Remote tele monitoring maintained. On IV Rocephin daily. Taking Tramadol as needed for pain but mostly denies pain. Discharge plan is pending.  Problem: Patient Centered Care  Goal: Patient preferences are identified and integrated in the patient's plan of care  Description: Interventions:  - What would you like us to know as we care for you?   - Provide timely, complete, and accurate information to patient/family  - Incorporate patient and family knowledge, values, beliefs, and cultural backgrounds into the planning and delivery of care  - Encourage patient/family to participate in care and decision-making at the level they choose  - Honor patient and family perspectives and choices  Outcome: Progressing     Problem: Patient/Family Goals  Goal: Patient/Family Long Term Goal  Description: Patient's Long Term Goal:     Interventions:  -   - See additional Care Plan goals for specific interventions  Outcome: Progressing  Goal: Patient/Family Short Term Goal  Description: Patient's Short Term Goal:     Interventions:   -   - See additional Care Plan goals for specific interventions  Outcome: Progressing     Problem: PAIN - ADULT  Goal: Verbalizes/displays adequate comfort level or patient's stated pain goal  Description: INTERVENTIONS:  - Encourage pt to monitor pain and request assistance  - Assess pain using appropriate pain scale  - Administer analgesics based on type and severity of pain and evaluate response  - Implement non-pharmacological measures as appropriate and evaluate response  - Consider cultural and social influences on pain and pain management  - Manage/alleviate anxiety  - Utilize distraction and/or relaxation techniques  - Monitor for opioid side effects  - Notify MD/LIP if interventions unsuccessful or patient reports new pain  - Anticipate increased pain with activity and pre-medicate as  appropriate  Outcome: Progressing     Problem: SAFETY ADULT - FALL  Goal: Free from fall injury  Description: INTERVENTIONS:  - Assess pt frequently for physical needs  - Identify cognitive and physical deficits and behaviors that affect risk of falls.  - [place] fall precautions as indicated by assessment.  - Educate pt/family on patient safety including physical limitations  - Instruct pt to call for assistance with activity based on assessment  - Modify environment to reduce risk of injury  - Provide assistive devices as appropriate  - Consider OT/PT consult to assist with strengthening/mobility  - Encourage toileting schedule  Outcome: Progressing

## 2024-04-21 NOTE — PLAN OF CARE
Patient is A&Ox4. VSS. RA. Up independently. IVF discontinued today. Repeat hemoglobin level is at 7.5 (7.1 this morning). Patient has champagne, draining clear, yellow urine. Tylenol administered in the morning for mild flank pain with good control. Lovenox put on hold. Call light is within reach.   Problem: Patient Centered Care  Goal: Patient preferences are identified and integrated in the patient's plan of care  Description: Interventions:  - What would you like us to know as we care for you?   - Provide timely, complete, and accurate information to patient/family  - Incorporate patient and family knowledge, values, beliefs, and cultural backgrounds into the planning and delivery of care  - Encourage patient/family to participate in care and decision-making at the level they choose  - Honor patient and family perspectives and choices  Outcome: Progressing     Problem: Patient/Family Goals  Goal: Patient/Family Long Term Goal  Description: Patient's Long Term Goal:     Interventions:  - See additional Care Plan goals for specific interventions  Outcome: Progressing  Goal: Patient/Family Short Term Goal  Description: Patient's Short Term Goal:     Interventions:   - See additional Care Plan goals for specific interventions  Outcome: Progressing     Problem: PAIN - ADULT  Goal: Verbalizes/displays adequate comfort level or patient's stated pain goal  Description: INTERVENTIONS:  - Encourage pt to monitor pain and request assistance  - Assess pain using appropriate pain scale  - Administer analgesics based on type and severity of pain and evaluate response  - Implement non-pharmacological measures as appropriate and evaluate response  - Consider cultural and social influences on pain and pain management  - Manage/alleviate anxiety  - Utilize distraction and/or relaxation techniques  - Monitor for opioid side effects  - Notify MD/LIP if interventions unsuccessful or patient reports new pain  - Anticipate increased pain  with activity and pre-medicate as appropriate  Outcome: Progressing     Problem: SAFETY ADULT - FALL  Goal: Free from fall injury  Description: INTERVENTIONS:  - Assess pt frequently for physical needs  - Identify cognitive and physical deficits and behaviors that affect risk of falls.  - [place] fall precautions as indicated by assessment.  - Educate pt/family on patient safety including physical limitations  - Instruct pt to call for assistance with activity based on assessment  - Modify environment to reduce risk of injury  - Provide assistive devices as appropriate  - Consider OT/PT consult to assist with strengthening/mobility  - Encourage toileting schedule  Outcome: Progressing

## 2024-04-22 VITALS
HEIGHT: 69 IN | HEART RATE: 64 BPM | BODY MASS INDEX: 23.28 KG/M2 | TEMPERATURE: 98 F | DIASTOLIC BLOOD PRESSURE: 73 MMHG | OXYGEN SATURATION: 99 % | SYSTOLIC BLOOD PRESSURE: 131 MMHG | WEIGHT: 157.19 LBS | RESPIRATION RATE: 16 BRPM

## 2024-04-22 LAB
ANION GAP SERPL CALC-SCNC: 5 MMOL/L (ref 0–18)
BUN BLD-MCNC: 10 MG/DL (ref 9–23)
BUN/CREAT SERPL: 11 (ref 10–20)
CALCIUM BLD-MCNC: 8.5 MG/DL (ref 8.7–10.4)
CHLORIDE SERPL-SCNC: 108 MMOL/L (ref 98–112)
CO2 SERPL-SCNC: 26 MMOL/L (ref 21–32)
CREAT BLD-MCNC: 0.91 MG/DL
DEPRECATED RDW RBC AUTO: 48.6 FL (ref 35.1–46.3)
EGFRCR SERPLBLD CKD-EPI 2021: 89 ML/MIN/1.73M2 (ref 60–?)
ERYTHROCYTE [DISTWIDTH] IN BLOOD BY AUTOMATED COUNT: 15.1 % (ref 11–15)
GLUCOSE BLD-MCNC: 86 MG/DL (ref 70–99)
HCT VFR BLD AUTO: 24.6 %
HGB BLD-MCNC: 7.9 G/DL
MCH RBC QN AUTO: 28.9 PG (ref 26–34)
MCHC RBC AUTO-ENTMCNC: 32.1 G/DL (ref 31–37)
MCV RBC AUTO: 90.1 FL
OSMOLALITY SERPL CALC.SUM OF ELEC: 286 MOSM/KG (ref 275–295)
PLATELET # BLD AUTO: 468 10(3)UL (ref 150–450)
POTASSIUM SERPL-SCNC: 4.1 MMOL/L (ref 3.5–5.1)
RBC # BLD AUTO: 2.73 X10(6)UL
SODIUM SERPL-SCNC: 139 MMOL/L (ref 136–145)
WBC # BLD AUTO: 9 X10(3) UL (ref 4–11)

## 2024-04-22 PROCEDURE — 85027 COMPLETE CBC AUTOMATED: CPT | Performed by: HOSPITALIST

## 2024-04-22 PROCEDURE — 80048 BASIC METABOLIC PNL TOTAL CA: CPT | Performed by: HOSPITALIST

## 2024-04-22 RX ORDER — SULFAMETHOXAZOLE AND TRIMETHOPRIM 800; 160 MG/1; MG/1
1 TABLET ORAL 2 TIMES DAILY
Qty: 10 TABLET | Refills: 0 | Status: SHIPPED | OUTPATIENT
Start: 2024-04-22 | End: 2024-04-22

## 2024-04-22 RX ORDER — SULFAMETHOXAZOLE AND TRIMETHOPRIM 800; 160 MG/1; MG/1
1 TABLET ORAL 2 TIMES DAILY
Qty: 10 TABLET | Refills: 0 | Status: SHIPPED | OUTPATIENT
Start: 2024-04-22 | End: 2024-04-27

## 2024-04-22 NOTE — PAYOR COMM NOTE
--------------  ADMISSION REVIEW     Payor: [employer] AD[employer] PPO  Subscriber #:  [id number]  Authorization Number: [id number]    Admit date: 24  Admit time:  5:02 AM       REVIEW DOCUMENTATION:    [facility] Note  Patient: Mike Buck Age: 73 year old Sex: male      MRN: [id number]  : 1950    Patient Seen in: [facility] Emergency Department    History     Chief Complaint   Patient presents with    Flank Pain     Stated Complaint: Flank pain    History obtained from: Patient and patient's wife    73-year-old male with past medical history of hypertension, hyperlipidemia, CAD status post CABG, prostate cancer status post robot-assisted radical prostatectomy with bilateral pelvic lymph node dissection and bladder neck reconstruction on 3/27/2024 with Dr. Omer complicated by bladder leak that was managed nonoperatively at [facility] presenting for evaluation of left-sided flank pain.  He states that starting this evening, he began having sharp pain in his left flank.  Pain has been constant since its onset.  Does not radiate.  Associated nausea and vomiting but no fevers.  Has an indwelling Garces in place since his surgery      Specific Social Determinants of Health:   Social History     Socioeconomic History    Marital status:     Number of children: 3   Occupational History    Occupation: -retired   Tobacco Use    Smoking status: Former     Current packs/day: 0.00     Types: Cigarettes     Quit date: 1980     Years since quittin.3    Smokeless tobacco: Never   Vaping Use    Vaping status: Never Used   Substance and Sexual Activity    Alcohol use: No     Alcohol/week: 0.0 standard drinks of alcohol     Comment: one per year    Drug use: No       PSFH elements reviewed from today and agreed except as otherwise stated in HPI.    Physical Exam     ED Triage Vitals   BP 24 0134 138/59   Pulse 24 0155 79   Resp 24 0134 18    Temp 04/20/24 0134 97.7 °F (36.5 °C)   Temp src 04/20/24 0503 Oral   SpO2 04/20/24 0155 97 %   O2 Device 04/20/24 0155 None (Room air)       ED Course   Labs:   Labs Reviewed   COMP METABOLIC PANEL (14) - Abnormal; Notable for the following components:       Result Value    Glucose 141 (*)     Calcium, Total 8.6 (*)     All other components within normal limits   URINALYSIS WITH CULTURE REFLEX - Abnormal; Notable for the following components:    Clarity Urine Ex.Turbid (*)     Blood Urine 3+ (*)     Protein Urine 50 (*)     Leukocyte Esterase Urine 500 (*)     WBC Urine >50 (*)     RBC Urine >10 (*)     WBC Clump Present (*)     All other components within normal limits   POCT GLUCOSE - Abnormal; Notable for the following components:    POC Glucose  139 (*)     All other components within normal limits   CBC W/ DIFFERENTIAL - Abnormal; Notable for the following components:    WBC 17.3 (*)     RBC 2.80 (*)     HGB 8.3 (*)     HCT 25.5 (*)     RDW-SD 49.7 (*)     RDW 15.1 (*)     .0 (*)     Neutrophil Absolute Prelim 15.78 (*)     Neutrophil Absolute 15.78 (*)     Lymphocyte Absolute 0.63 (*)      External non-ED records reviewed independently by me: Discharge summary from 4/7/2024 confirms patient was admitted to [facility] for abdominal distention.  During hospitalization, had a CT abdomen pelvis performed on 4/5/2024 that showed a focus of fluid collection posterior to the bladder.  CT cystogram showed a large volume of extravasated contrast in the abdominal cavity likely secondary to leak at the level of the bladder neck posterior laterally at surgical site.    MDM   73-year-old male with a past medical history of hypertension, hyperlipidemia, CAD status post CABG, prostate cancer status post robot-assisted radical prostatectomy with bilateral pelvic lymph node dissection and bladder neck reconstruction on 3/27/2024 with Dr. Omer complicated by bladder leak that was managed nonoperatively at [facility] presenting for evaluation of left-sided flank pain.      ED Course as of 04/20/24 0514  ------------------------------------------------------------  Time: 04/20 0346  Comment: Independent reviewed the CT kidney stone protocol images that show no evidence of visualized kidney stone.  Agree with radiology read above which notes left-sided pyelonephritis.  Labs with WBC of 17.3 consistent with infectious etiology.  Urinalysis with borderline urinary tract infection.  Recent urine culture showed no growth.  No evidence of drug-resistant bacteria.  Start antibiotics with Rocephin.  I endorsed the patient's case with the [facility] hospitalist accepted the patient for admission for further IV antibiotics.  Also discussed with urology, Dr. Arango, who was made aware of new consult.          Disposition and Plan     Clinical Impression:  1. Pyelonephritis          [facility] Hospitalist H&P       CC:   Chief Complaint   Patient presents with    Flank Pain        PCP: Sanjiv Sams MD    Date of Admission: 4/20/2024  1:52 AM    ASSESSMENT / PLAN:       Mr. Buck is a 72 yo M with PMH of CAD s/p CABG and stents, HTN, HL, prostate CA s/p robotic radial prostatectomy and pelvic LN diseection, bladder neck reconstruction  3/27/24, complicated by bladder leak, who presents with worsening flank pain.       Pyelonephritis  UTI  - continue abx  - f/u Ucx  - check BCx    Post op bladder leak  S/p prostatectomy  - continue champagne  - urology consulted    Leukocytosis  - likely 2/2 above  - continue ceftriaxone  - f/u Ucx  - check Bcx    Anemia  - Hg 9.2 on recent outpatient labs  - Hg 8.9 on admission  - monitor    CAD s/p CABG  - no cardiac symptoms  - hold ASA/plavix for now until urology evaluation  - due to follow-up with cardiology as outpatient for angiogram for abnormal stress test, not symptomatic at the time, noted during pre-op stress test    HTN  - BP stable  - hold home meds    HL  - statin    FN:  - IVF: NS  - Diet: cardiac      DVT Prophy: SCD, lovenox  Lines: PIV    Dispo: pending clinical course    Outpatient records or previous hospital records reviewed.     Further recommendations pending patient's clinical course.  [facility] hospitalist to continue to follow patient while in house    Patient and/or patient's family given opportunity to ask questions and note understanding and agreeing with therapeutic plan as outlined    Joelle Jordan MD  [facility] [place] [facility]  Answering Service number: [phone]    HPI       History of Present Illness:      Mr. Buck is a 74 yo M with PMH of CAD s/p CABG and stents, HTN, HL, prostate CA s/p robotic radial prostatectomy and pelvic LN diseection, bladder neck reconstruction  3/27/24, complicated by bladder leak, who presents with worsening flank pain.     Patient initially had surgery March 27th with Dr. Omer for prostatectomy. He was seen in the ER about 5 days later for abdominal bloating and constipation, improved with enema and discharged home. However he developed severe abdominal pain a few days later and was taken by ambulance to [place] where he was diagnosed with urethrovesical anastomotic leak, this was managed medically, did not need surgery. He was admitted 4/4-4/7. He had follow up with his urologist Dr. Omer on 4/11, champagne catheter exchanged at that time, recommended continue champagne for at least 2 more weeks with plans for repeat CT cystogram prior to removal of champagne.     In the ED, vitals stable. Labs with leukocytosis, Hg 8.3, . CT A/P with L sided perinephric stranding, perivesical standing. Fluid collection up to 10.cm, possible urinoma vs. Post op seroma. Urology consulted.         PMH  Past Medical History:    Anesthesia complication    Anxiety    BPH (benign prostatic hyperplasia)    Cold with flu    per pt: sore throat since 9/9/15; denies fever    CORONARY ARTERY DISEASE    CABG x 5 vessels    CORONARY ARTERY DISEASE    PTCA c stent  x 2    Depression    after CABG     Hearing impairment    hearing aids    High blood pressure    History of chest pain    Hx of motion sickness    Hypercholesterolemia    HYPERLIPIDEMIA    HYPERTENSION    Hypertension    Infectious disease    shingles x 1 incident    Osteoarthritis    OTHER DISEASES    chronic urticaria    OTHER DISEASES    + rectal polyp     Rectal polyp        PSH  Past Surgical History:   Procedure Laterality Date    Angioplasty (coronary)  04/2010    x 1 stent    Angioplasty (coronary)  2013    x 1 stent    Cabg  2002    x 5 vessels    Colonoscopy  2002     1 adenomatous polyp    Colonoscopy  07/2010    no polyps, repeat 5 yrs    Colonoscopy  11/2018    Knee arthroscopy Right 08/18/2017    Dr. Manriquez    Other surgical history  1980's    lipomectomy back    Other surgical history Right 1984    heel spur removed    Partial removal, clavicle Left 09/17/2015    Procedure: SHOULDER OPEN ROTATOR CUFF REPAIR;  Surgeon: Leopoldo Manriquez MD;  Location: [facility]    Prostate surgery      Shldr arthroscop,part debride Left 09/17/2015    Procedure: SHOULDER OPEN ROTATOR CUFF REPAIR;  Surgeon: Leopoldo Manriquez MD;  Location: [facility]    Tonsillectomy      as child        ALL:  Allergies   Allergen Reactions    Other HIVES     Food - spices        Home Medications:  Outpatient Medications Marked as Taking for the 4/20/24 encounter (Hospital Encounter)   Medication Sig Dispense Refill    ramipril 10 MG Oral Cap TAKE 1 CAPSULE EVERY DAY FOR BLOOD PRESSURE MUST BE SEEN FOR ANY FURTHER REFILLS. (Patient taking differently: Take 1 capsule (10 mg total) by mouth at bedtime. TAKE 1 CAPSULE EVERY DAY FOR BLOOD PRESSURE MUST BE SEEN FOR ANY FURTHER REFILLS.) 90 capsule 0    escitalopram 20 MG Oral Tab TAKE 1 TABLET EVERY DAY FOR ANXIETY (NEED MD APPOINTMENT FOR REFILLS) 90 tablet 0    tamsulosin (FLOMAX) cap Take 1 capsule (0.4 mg total) by mouth daily. 90 capsule 1    atorvastatin 40 MG Oral Tab Take 1 tablet (40 mg total) by mouth As  Directed.      Clopidogrel Bisulfate 75 MG Oral Tab Take 1 tablet (75 mg total) by mouth As Directed.      aspirin 81 MG Oral Tab Take 1 tablet (81 mg total) by mouth daily.         Additional Diagnostics:      Radiology: CT ABDOMEN+PELVIS KIDNEYSTONE 2D RNDR(NO IV,NO ORAL)(CPT=74176)    Result Date: 4/20/2024  CONCLUSION:  1. Despite the placement of a Garces catheter, there perivesical fat stranding, which could reflect cystitis in the appropriate clinical setting.  2. There is diffuse left hydroureter and moderate left-sided perinephric and periureteric fat stranding, which may relate to obstructive uropathy, but correlation with urinalysis is recommended to ascertain for potential superimposed ascending urinary tract infection of the left collecting system/pyelonephritis.  3. A nonspecific fluid collection is demonstrated in the space of Retzius, measuring up to 10.4 cm. This could reflect a urinoma in the appropriate clinical setting or, potentially, postoperative seroma, with or without superimposed infection.  4. Postoperative changes of prostatectomy with additional phlegmonous fluid in the operative bed.  5. A punctate focus of gas within adjacent fluid may reflect sequela of operative intervention.  6. Cholelithiasis without CT evidence acute complication.  7. Periampullary duodenal diverticulum without CT evidence acute complication.   8. Small volume intraperitoneal ascites.  9. Partially visualized postthoracotomy chest with calcified pleural plaques, which may reflect prior specific exposure.  10. Low-density appearance of the intracardiac blood pool raises the possibility of underlying anemia. Correlate with hematologic parameters.  11. Lesser incidental findings as above.    A preliminary report was issued by the [facility] teleradiology service. There are no major discrepancies.   Dictated by (CST): Ky Ojeda MD on 4/20/2024 at 6:58 AM     Finalized by (CST): Ky Ojeda MD on  4/20/2024 at 7:10 AM             4/20 UROLOGY     History of Present Illness:  Mike Buck is a(n) 73 year old male. He has had a radical prostatectomy 3-2024. He has had s complicated post-op course: BN leak, ileus and now possible L pyelonephritis.he is feeling better since being in the hospital.     Assessment:     BN Leak  Prostate Ca  ? L Pyelonephritis     Plan:  -champagne  -ambulate  -antibiotics  -await culture  -possible CT Urogram soon  -trend WBC     Chika Arango MD      4/21/24 HOSPITALIST    Acute on chronic Anemia  - Hg 9.2 on recent outpatient labs  - Hg 8.9 on admission  - down to 7.1 this AM, no evidence of bleeding, repeat Hg today  - hold blood thinners     Post op bladder leak  S/p prostatectomy  - continue champagne  - urology consulted     Leukocytosis- improving  - likely 2/2 above  - continue ceftriaxone  - f/u Ucx  - check Bcx         Latest Reference Range & Units 04/20/24 02:29 04/21/24 06:22 04/22/24 05:06   Glucose 70 - 99 mg/dL 141 (H) 87 86   Sodium 136 - 145 mmol/L 136 139 139   Potassium 3.5 - 5.1 mmol/L 4.4 4.2 4.1   Chloride 98 - 112 mmol/L 103 109 108   Carbon Dioxide, Total 21.0 - 32.0 mmol/L 24.0 25.0 26.0   BUN 9 - 23 mg/dL 20 11 10   CREATININE 0.70 - 1.30 mg/dL 1.21 0.86 0.91   CALCIUM 8.7 - 10.4 mg/dL 8.6 (L) 8.1 (L) 8.5 (L)   BUN/CREATININE RATIO 10.0 - 20.0  16.5 12.8 11.0   EGFR >=60 mL/min/1.73m2 63 91 89   (H): Data is abnormally high  (L): Data is abnormally low     Latest Reference Range & Units 04/20/24 02:29 04/21/24 06:22 04/21/24 12:10 04/22/24 05:06   WBC 4.0 - 11.0 x10(3) uL 17.3 (H) 8.8  9.0   Hemoglobin 13.0 - 17.5 g/dL 8.3 (L) 7.1 (L) 7.5 (L) 7.9 (L)   Hematocrit 39.0 - 53.0 % 25.5 (L) 21.9 (L)  24.6 (L)   Platelet Count 150.0 - 450.0 10(3)uL 520.0 (H) 431.0  468.0 (H)   (H): Data is abnormally high  (L): Data is abnormally low  MEDICATIONS ADMINISTERED IN LAST 1 DAY:  acetaminophen (Tylenol Extra Strength) tab 500 mg       Date Action Dose Route User     4/21/2024 2048 Given 500 mg Oral Mariana Delgado RN          atorvastatin (Lipitor) tab 40 mg       Date Action Dose Route User    4/21/2024 2048 Given 40 mg Oral Mariana Delgado RN          cefTRIAXone (Rocephin) 1 g in D5W 100 mL IVPB-ADD       Date Action Dose Route User    4/21/2024 2048 New Bag 1 g Intravenous Mariana Delgado RN          docusate sodium (Colace) cap 100 mg       Date Action Dose Route User    4/22/2024 0804 Given 100 mg Oral Jana Brenner RN lic pend    4/21/2024 2048 Given 100 mg Oral Mariana Delgado RN          escitalopram (Lexapro) tab 20 mg       Date Action Dose Route User    4/22/2024 0804 Given 20 mg Oral Jana Brenner RN lic pend               Vitals (last day)       Date/Time Temp Pulse Resp BP SpO2 Weight O2 Device O2 Flow Rate (L/min) [facility]    04/22/24 0800 98.1 °F (36.7 °C) 64 16 131/73 99 % -- None (Room air) -- JT    04/22/24 0511 97.8 °F (36.6 °C) -- 18 144/74 100 % -- None (Room air) -- DM    04/21/24 2016 97.8 °F (36.6 °C) -- 18 131/71 100 % -- None (Room air) -- DM    04/21/24 1558 -- 73 18 128/74 99 % -- None (Room air) -- KA    04/21/24 0736 -- 68 18 157/71 99 % -- None (Room air) -- KA    04/21/24 0420 97.7 °F (36.5 °C) -- 18 133/70 99 % -- -- -- DP

## 2024-04-22 NOTE — DISCHARGE INSTRUCTIONS
Follow up with your Urologist  Clean champagne catheter twice a day and as needed ( see attached instructions)  Take antibiotics as ordered  Contact Primary or Urologist with additional questions   Call to make an appointment to get a follow up CBC blood draw this week.

## 2024-04-22 NOTE — PLAN OF CARE
Mike is A&Ox4 on room air. He is on telemetry. He is currently has a champagne in place that is to remain in place until his urologist clears him. He denies any pain and is ambulating self in room. Per MD he is okay to resume plavix and aspirin and will need a repeat CBC in 1 week. He is being discharged. All belongings were returned to him. Discharge instructions were given and understood by mike. All questions were answered. He was discharged back to home with family.  Problem: Patient Centered Care  Goal: Patient preferences are identified and integrated in the patient's plan of care  Description: Interventions:  - What would you like us to know as we care for you?   - Provide timely, complete, and accurate information to patient/family  - Incorporate patient and family knowledge, values, beliefs, and cultural backgrounds into the planning and delivery of care  - Encourage patient/family to participate in care and decision-making at the level they choose  - Honor patient and family perspectives and choices  Outcome: Adequate for Discharge  Problem: PAIN - ADULT  Goal: Verbalizes/displays adequate comfort level or patient's stated pain goal  Description: INTERVENTIONS:  - Encourage pt to monitor pain and request assistance  - Assess pain using appropriate pain scale  - Administer analgesics based on type and severity of pain and evaluate response  - Implement non-pharmacological measures as appropriate and evaluate response  - Consider cultural and social influences on pain and pain management  - Manage/alleviate anxiety  - Utilize distraction and/or relaxation techniques  - Monitor for opioid side effects  - Notify MD/LIP if interventions unsuccessful or patient reports new pain  - Anticipate increased pain with activity and pre-medicate as appropriate  Outcome: Adequate for Discharge     Problem: SAFETY ADULT - FALL  Goal: Free from fall injury  Description: INTERVENTIONS:  - Assess pt frequently for physical  needs  - Identify cognitive and physical deficits and behaviors that affect risk of falls.  - [place] fall precautions as indicated by assessment.  - Educate pt/family on patient safety including physical limitations  - Instruct pt to call for assistance with activity based on assessment  - Modify environment to reduce risk of injury  - Provide assistive devices as appropriate  - Consider OT/PT consult to assist with strengthening/mobility  - Encourage toileting schedule  Outcome: Adequate for Discharge

## 2024-04-22 NOTE — CDS QUERY
CLINICAL DOCUMENTATION CLARIFICATION FORM  Dear Doctor: Julian     Please clarify the relationship between Pyelonephritis and the Chronic Indwelling Champagne Catheter  PLEASE (X) ALL DIAGNOSES THAT APPLY.  THIS SECTION FOR PROVIDER DOCUMENTATION ONLY    ( )  Yes, the Pyelonephritis is related to or due to the chronic indwelling champagne catheter     ( x )  No, the pyelonephritis is unrelated to the chronic indwelling champagne catheter    ( )  Other (please specify):       Presented to ED for left side flank pain with nausea and vomiting   CT of abdomen/pelvis showing pyelonephritis  S/P Radical Prostatectomy and bladder neck reconstruction on 3/27/24, champagne placed at that time   Urethral Catheter present on this admission   Receiving  IV Ceftriaxone     If you have any questions, please contact Clinical :  Gabo CLANCY RN at [phone]     Thank You!    THIS FORM IS A PERMANENT PART OF THE MEDICAL RECORD

## 2024-04-23 NOTE — DISCHARGE SUMMARY
General Medicine Discharge Summary     Patient ID:  Mike Buck  73 year old  11/28/1950    Admit date: 4/20/2024    Discharge date and time: 04/22/24    Attending Physician: No att. providers found     Primary Care Physician: Sanjiv Sams MD     Reason for admission: flank pain    Discharge Diagnoses: Pyelonephritis [N12]    Discharged Condition: stable    Disposition: home    Consults:   Consultants         Provider   Role Specialty     Chika Arango MD      Consulting Physician UROLOGY              HPI: Mr. Buck is a 72 yo M with PMH of CAD s/p CABG and stents, HTN, HL, prostate CA s/p robotic radial prostatectomy and pelvic LN diseection, bladder neck reconstruction  3/27/24, complicated by bladder leak, who presents with worsening flank pain.      Patient initially had surgery March 27th with Dr. Omer for prostatectomy. He was seen in the ER about 5 days later for abdominal bloating and constipation, improved with enema and discharged home. However he developed severe abdominal pain a few days later and was taken by ambulance to [place] where he was diagnosed with urethrovesical anastomotic leak, this was managed medically, did not need surgery. He was admitted 4/4-4/7. He had follow up with his urologist Dr. Omer on 4/11, champagne catheter exchanged at that time, recommended continue champagne for at least 2 more weeks with plans for repeat CT cystogram prior to removal of champagne.      In the ED, vitals stable. Labs with leukocytosis, Hg 8.3, . CT A/P with L sided perinephric stranding, perivesical standing. Fluid collection up to 10.cm, possible urinoma vs. Post op seroma. Urology consulted.        Hospital Course:       Mr. Buck is a 72 yo M with PMH of CAD s/p CABG and stents, HTN, HL, prostate CA s/p robotic radial prostatectomy and pelvic LN diseection, bladder neck reconstruction  3/27/24, complicated by bladder leak, who presents with worsening flank pain.          Pyelonephritis  UTI  - continue abx  - f/u Ucx  - check BCx     Acute on chronic Anemia  - Hg 9.2 on recent outpatient labs  - Hg 8.9 on admission  - down to 7.1 this AM, no evidence of bleeding, repeat Hg today  - hold blood thinners     Post op bladder leak  S/p prostatectomy  - continue champagne  - urology consulted     Leukocytosis- improving  - likely 2/2 above  - continue ceftriaxone  - f/u Ucx  - check Bcx     CAD s/p CABG  - no cardiac symptoms  - hold ASA/plavix for now until urology evaluation  - due to follow-up with cardiology as outpatient for angiogram for abnormal stress test, not symptomatic at the time, noted during pre-op stress test     HTN  - BP stable  - hold home meds     HL  - statin       Exam   GEN: male in NAD  HEENT: EOMI  Pulm: CTAB, no crackles or wheezes  CV: RRR, no murmurs  ABD: Soft, mild TTP, mildly distended, +BS  MSK: L flank pain  SKIN: warm, dry, mild bruising of lower abdomen present on admission  EXT: no edema    Operative Procedures:      Imaging: No results found.        Home Medication Changes:     I reconciled current and discharge medications on day of discharge. These medication changes have been made as able         Medication List        START taking these medications      sulfamethoxazole-trimethoprim -160 MG Tabs per tablet  Commonly known as: Bactrim DS  Take 1 tablet by mouth 2 (two) times daily for 5 days.            CHANGE how you take these medications      ramipril 10 MG Caps  Commonly known as: Altace  TAKE 1 CAPSULE EVERY DAY FOR BLOOD PRESSURE MUST BE SEEN FOR ANY FURTHER REFILLS.  What changed:   how much to take  how to take this  when to take this            CONTINUE taking these medications      aspirin 81 MG Tabs     atorvastatin 40 MG Tabs  Commonly known as: Lipitor     BIOTIN OR     clopidogrel 75 MG Tabs  Commonly known as: Plavix     escitalopram 20 MG Tabs  Commonly known as: Lexapro  TAKE 1 TABLET EVERY DAY FOR ANXIETY (NEED MD APPOINTMENT  FOR REFILLS)     indomethacin 25 MG Caps  Commonly known as: Indocin  Take one tablet by mouth up to every six hours with food for gout     oxybutynin ER 5 MG Tb24  Commonly known as: Ditropan-XL  Take 1 tablet (5 mg total) by mouth daily.     tamsulosin 0.4 MG Caps  Commonly known as: Flomax  Take 1 capsule (0.4 mg total) by mouth daily.               Where to Get Your Medications        You can get these medications from any pharmacy    Bring a paper prescription for each of these medications  sulfamethoxazole-trimethoprim -160 MG Tabs per tablet         Activity: activity as tolerated  Diet: cardiac diet  Wound Care: none needed  Code Status: Full Code  O2: n/a    Follow-up with:    PCP   Specialist urology       FU   Follow-up Information       Sanjiv Sams MD Follow up in 3 day(s).    Specialty: Family Practice  Why: coordinator to call you for follow up appt  Contact information:  [address]  [address]  [facility] [id number] [phone]               Jamie Omer MD Follow up on 5/7/2024.    Specialty: UROLOGY  Why: 5/7 as previously scheduled  Contact information:  [address]  [address]  [facility] [id number] [phone]                             DC instructions:      Other Discharge Instructions:         Follow up with your Urologist  Clean champagne catheter twice a day and as needed ( see attached instructions)  Take antibiotics as ordered  Contact Primary or Urologist with additional questions   Call to make an appointment to get a follow up CBC blood draw this week.          Follow-up with labs: cbc in 1 week    Total Time Coordinating Care: 31 minutes    Patient had opportunity to ask questions and state understand and agree with therapeutic plan as outlined      Joelle Jordan MD  [facility] Hospitalist

## 2024-04-23 NOTE — PAYOR COMM NOTE
--------------  DISCHARGE REVIEW    Payor: [employer] PPO  Subscriber #:  [id number]  Authorization Number: [id number]    Admit date: 4/20/24  Admit time:   5:02 AM  Discharge Date: 4/22/2024  4:19 PM     Admitting Physician: Jignesh Hutchinson DO  Attending Physician:  No att. providers found  Primary Care Physician: Sanjiv Sams MD       REVIEWER COMMENTS       )  Yes, the Pyelonephritis is related to or due to the chronic indwelling champagne catheter     ( x )  No, the pyelonephritis is unrelated to the chronic indwelling champagne catheter    ( )  Other (please specify):         Presented to ED for left side flank pain with nausea and vomiting   CT of abdomen/pelvis showing pyelonephritis  S/P Radical Prostatectomy and bladder neck reconstruction on 3/27/24, champagne placed at that time   Urethral Catheter present on this admission   Receiving  IV Ceftriaxone          He is currently has a champagne in place that is to remain in place until his urologist clears him. He denies any pain and is ambulating self in room. Per MD he is okay to resume plavix and aspirin and will need a repeat CBC in 1 week. He is being discharged.     Discharged: 4/22/2024

## 2024-04-24 NOTE — CDS QUERY
CLINICAL DOCUMENTATION CLARIFICATION FORM  Dear Doctor: Julian   Please further clarify the type of Acute on Chronic Anemia. Thank you   PLEASE  (X) ALL DIAGNOSES THAT APPLY.  SELECTION BY PROVIDER ONLY      ( )  Anemia related to Iron Deficiency     ( )  Other (please specify):          Admitted for Pyelonephritis   4/21 Progress Note- Acute on Chronic Anemia  - Hg 9.2 on recent outpatient labs  - Hg 8.9 on admission  - down to 7.1 this AM, no evidence of bleeding, repeat Hg today  - hold blood thinners  IRON STUDIES 4/20/24- iron 29-- iron saturation 12-- total iron binding capacity 237-- transferrin 159  If you have any questions, please contact Clinical :  Gabo CLANCY RN at [phone]     Thank You!    THIS FORM IS A PERMANENT PART OF THE MEDICAL RECORD

## 2024-09-09 ENCOUNTER — LAB ENCOUNTER (OUTPATIENT)
Dept: LAB | Facility: HOSPITAL | Age: 74
End: 2024-09-09
Attending: STUDENT IN AN ORGANIZED HEALTH CARE EDUCATION/TRAINING PROGRAM
Payer: MEDICARE

## 2024-09-09 DIAGNOSIS — E78.5 HYPERLIPIDEMIA: Primary | ICD-10-CM

## 2024-09-09 LAB
CHOLEST SERPL-MCNC: 118 MG/DL (ref ?–200)
FASTING PATIENT LIPID ANSWER: YES
HDLC SERPL-MCNC: 37 MG/DL (ref 40–59)
LDLC SERPL CALC-MCNC: 56 MG/DL (ref ?–100)
NONHDLC SERPL-MCNC: 81 MG/DL (ref ?–130)
TRIGL SERPL-MCNC: 142 MG/DL (ref 30–149)
VLDLC SERPL CALC-MCNC: 21 MG/DL (ref 0–30)

## 2024-09-09 PROCEDURE — 80061 LIPID PANEL: CPT

## 2024-09-09 PROCEDURE — 36415 COLL VENOUS BLD VENIPUNCTURE: CPT

## 2025-02-10 ENCOUNTER — HOSPITAL ENCOUNTER (OUTPATIENT)
Dept: GENERAL RADIOLOGY | Facility: HOSPITAL | Age: 75
Discharge: HOME | End: 2025-02-10
Attending: STUDENT IN AN ORGANIZED HEALTH CARE EDUCATION/TRAINING PROGRAM
Payer: MEDICARE

## 2025-02-10 ENCOUNTER — LAB ENCOUNTER (OUTPATIENT)
Dept: LAB | Facility: HOSPITAL | Age: 75
End: 2025-02-10
Attending: STUDENT IN AN ORGANIZED HEALTH CARE EDUCATION/TRAINING PROGRAM
Payer: MEDICARE

## 2025-02-10 DIAGNOSIS — Z01.818 PREOP EXAMINATION: ICD-10-CM

## 2025-02-10 DIAGNOSIS — Z01.818 PREOP EXAMINATION: Primary | ICD-10-CM

## 2025-02-10 LAB
ANION GAP SERPL CALC-SCNC: 11 MMOL/L (ref 0–18)
BUN BLD-MCNC: 14 MG/DL (ref 9–23)
BUN/CREAT SERPL: 14.9 (ref 10–20)
CALCIUM BLD-MCNC: 9.4 MG/DL (ref 8.7–10.4)
CHLORIDE SERPL-SCNC: 103 MMOL/L (ref 98–112)
CO2 SERPL-SCNC: 26 MMOL/L (ref 21–32)
CREAT BLD-MCNC: 0.94 MG/DL
EGFRCR SERPLBLD CKD-EPI 2021: 85 ML/MIN/1.73M2 (ref 60–?)
FASTING STATUS PATIENT QL REPORTED: NO
GLUCOSE BLD-MCNC: 70 MG/DL (ref 70–99)
OSMOLALITY SERPL CALC.SUM OF ELEC: 289 MOSM/KG (ref 275–295)
POTASSIUM SERPL-SCNC: 4 MMOL/L (ref 3.5–5.1)
SODIUM SERPL-SCNC: 140 MMOL/L (ref 136–145)

## 2025-02-10 PROCEDURE — 80048 BASIC METABOLIC PNL TOTAL CA: CPT

## 2025-02-10 PROCEDURE — 71046 X-RAY EXAM CHEST 2 VIEWS: CPT | Performed by: STUDENT IN AN ORGANIZED HEALTH CARE EDUCATION/TRAINING PROGRAM

## 2025-02-10 PROCEDURE — 85025 COMPLETE CBC W/AUTO DIFF WBC: CPT

## 2025-02-10 PROCEDURE — 85060 BLOOD SMEAR INTERPRETATION: CPT

## 2025-02-10 PROCEDURE — 36415 COLL VENOUS BLD VENIPUNCTURE: CPT

## 2025-02-11 LAB
BASOPHILS # BLD AUTO: 0.08 X10(3) UL (ref 0–0.2)
BASOPHILS NFR BLD AUTO: 0.3 %
DEPRECATED RDW RBC AUTO: 46.5 FL (ref 35.1–46.3)
EOSINOPHIL # BLD AUTO: 0.08 X10(3) UL (ref 0–0.7)
EOSINOPHIL NFR BLD AUTO: 0.3 %
ERYTHROCYTE [DISTWIDTH] IN BLOOD BY AUTOMATED COUNT: 14.3 % (ref 11–15)
HCT VFR BLD AUTO: 46.9 %
HGB BLD-MCNC: 15.7 G/DL
IMM GRANULOCYTES # BLD AUTO: 0.21 X10(3) UL (ref 0–1)
IMM GRANULOCYTES NFR BLD: 0.8 %
LYMPHOCYTES # BLD AUTO: 2.87 X10(3) UL (ref 1–4)
LYMPHOCYTES NFR BLD AUTO: 10.5 %
MCH RBC QN AUTO: 29.8 PG (ref 26–34)
MCHC RBC AUTO-ENTMCNC: 33.5 G/DL (ref 31–37)
MCV RBC AUTO: 89 FL
MONOCYTES # BLD AUTO: 2.3 X10(3) UL (ref 0.1–1)
MONOCYTES NFR BLD AUTO: 8.4 %
NEUTROPHILS # BLD AUTO: 21.73 X10 (3) UL (ref 1.5–7.7)
NEUTROPHILS # BLD AUTO: 21.73 X10(3) UL (ref 1.5–7.7)
NEUTROPHILS NFR BLD AUTO: 79.7 %
PLATELET # BLD AUTO: 239 10(3)UL (ref 150–450)
RBC # BLD AUTO: 5.27 X10(6)UL
WBC # BLD AUTO: 27.3 X10(3) UL (ref 4–11)

## 2025-02-11 RX ORDER — AMLODIPINE BESYLATE 5 MG/1
5 TABLET ORAL DAILY
COMMUNITY

## 2025-02-11 RX ORDER — LORATADINE 10 MG/1
10 TABLET ORAL DAILY PRN
COMMUNITY

## 2025-02-11 RX ORDER — ASCORBIC ACID, THIAMINE, RIBOFLAVIN, NIACINAMIDE, PYRIDOXINE, FOLIC ACID, COBALAMIN, BIOTIN, PANTOTHENIC ACID 100; 1.5; 1.7; 20; 10; 1; 6; 300; 1 MG/1; MG/1; MG/1; MG/1; MG/1; MG/1; UG/1; UG/1; MG/1
1 TABLET, COATED ORAL DAILY
COMMUNITY

## 2025-02-18 ENCOUNTER — HOSPITAL ENCOUNTER (OUTPATIENT)
Dept: INTERVENTIONAL RADIOLOGY/VASCULAR | Facility: HOSPITAL | Age: 75
Discharge: HOME | End: 2025-02-18
Attending: STUDENT IN AN ORGANIZED HEALTH CARE EDUCATION/TRAINING PROGRAM | Admitting: INTERNAL MEDICINE
Payer: MEDICARE

## 2025-02-18 VITALS
HEIGHT: 69 IN | SYSTOLIC BLOOD PRESSURE: 137 MMHG | OXYGEN SATURATION: 96 % | BODY MASS INDEX: 25.38 KG/M2 | WEIGHT: 171.38 LBS | HEART RATE: 60 BPM | DIASTOLIC BLOOD PRESSURE: 89 MMHG | RESPIRATION RATE: 19 BRPM | TEMPERATURE: 97 F

## 2025-02-18 DIAGNOSIS — I25.10 CAD (CORONARY ARTERY DISEASE): ICD-10-CM

## 2025-02-18 DIAGNOSIS — R06.09 DOE (DYSPNEA ON EXERTION): ICD-10-CM

## 2025-02-18 DIAGNOSIS — R94.39 ABNORMAL NUCLEAR STRESS TEST: ICD-10-CM

## 2025-02-18 DIAGNOSIS — R07.9 CHEST PAIN: ICD-10-CM

## 2025-02-18 DIAGNOSIS — Z95.1 S/P CABG (CORONARY ARTERY BYPASS GRAFT): ICD-10-CM

## 2025-02-18 LAB — ISTAT ACTIVATED CLOTTING TIME: 273 SECONDS (ref 125–137)

## 2025-02-18 PROCEDURE — 85347 COAGULATION TIME ACTIVATED: CPT

## 2025-02-18 PROCEDURE — B2131ZZ FLUOROSCOPY OF MULTIPLE CORONARY ARTERY BYPASS GRAFTS USING LOW OSMOLAR CONTRAST: ICD-10-PCS | Performed by: INTERNAL MEDICINE

## 2025-02-18 PROCEDURE — 027034Z DILATION OF CORONARY ARTERY, ONE ARTERY WITH DRUG-ELUTING INTRALUMINAL DEVICE, PERCUTANEOUS APPROACH: ICD-10-PCS | Performed by: INTERNAL MEDICINE

## 2025-02-18 PROCEDURE — B2181ZZ FLUOROSCOPY OF LEFT INTERNAL MAMMARY BYPASS GRAFT USING LOW OSMOLAR CONTRAST: ICD-10-PCS | Performed by: INTERNAL MEDICINE

## 2025-02-18 PROCEDURE — 36415 COLL VENOUS BLD VENIPUNCTURE: CPT

## 2025-02-18 PROCEDURE — B2111ZZ FLUOROSCOPY OF MULTIPLE CORONARY ARTERIES USING LOW OSMOLAR CONTRAST: ICD-10-PCS | Performed by: INTERNAL MEDICINE

## 2025-02-18 PROCEDURE — 99152 MOD SED SAME PHYS/QHP 5/>YRS: CPT | Performed by: INTERNAL MEDICINE

## 2025-02-18 PROCEDURE — 99153 MOD SED SAME PHYS/QHP EA: CPT | Performed by: INTERNAL MEDICINE

## 2025-02-18 PROCEDURE — 93459 L HRT ART/GRFT ANGIO: CPT | Performed by: INTERNAL MEDICINE

## 2025-02-18 PROCEDURE — 4A023N7 MEASUREMENT OF CARDIAC SAMPLING AND PRESSURE, LEFT HEART, PERCUTANEOUS APPROACH: ICD-10-PCS | Performed by: INTERNAL MEDICINE

## 2025-02-18 RX ORDER — NITROGLYCERIN 20 MG/100ML
INJECTION INTRAVENOUS
Status: COMPLETED
Start: 2025-02-18 | End: 2025-02-18

## 2025-02-18 RX ORDER — HEPARIN SODIUM 1000 [USP'U]/ML
INJECTION, SOLUTION INTRAVENOUS; SUBCUTANEOUS
Status: COMPLETED
Start: 2025-02-18 | End: 2025-02-18

## 2025-02-18 RX ORDER — MIDAZOLAM HYDROCHLORIDE 1 MG/ML
INJECTION INTRAMUSCULAR; INTRAVENOUS
Status: COMPLETED
Start: 2025-02-18 | End: 2025-02-18

## 2025-02-18 RX ORDER — LIDOCAINE HYDROCHLORIDE 20 MG/ML
INJECTION, SOLUTION EPIDURAL; INFILTRATION; INTRACAUDAL; PERINEURAL
Status: COMPLETED
Start: 2025-02-18 | End: 2025-02-18

## 2025-02-18 RX ORDER — SODIUM CHLORIDE 9 MG/ML
INJECTION, SOLUTION INTRAVENOUS CONTINUOUS
Status: ACTIVE | OUTPATIENT
Start: 2025-02-18 | End: 2025-02-18

## 2025-02-18 RX ORDER — CLOPIDOGREL BISULFATE 75 MG/1
75 TABLET ORAL DAILY
Status: DISCONTINUED | OUTPATIENT
Start: 2025-02-19 | End: 2025-02-18

## 2025-02-18 RX ORDER — IOPAMIDOL 612 MG/ML
140 INJECTION, SOLUTION INTRAVASCULAR
Status: COMPLETED | OUTPATIENT
Start: 2025-02-18 | End: 2025-02-18

## 2025-02-18 RX ORDER — SODIUM CHLORIDE 9 MG/ML
3 INJECTION, SOLUTION INTRAVENOUS
Status: DISCONTINUED | OUTPATIENT
Start: 2025-02-18 | End: 2025-02-18

## 2025-02-18 RX ORDER — ASPIRIN 81 MG/1
81 TABLET ORAL DAILY
Status: DISCONTINUED | OUTPATIENT
Start: 2025-02-19 | End: 2025-02-18

## 2025-02-18 RX ORDER — CLOPIDOGREL BISULFATE 75 MG/1
TABLET ORAL
Status: COMPLETED
Start: 2025-02-18 | End: 2025-02-18

## 2025-02-18 RX ORDER — ASPIRIN 81 MG/1
324 TABLET, CHEWABLE ORAL ONCE
Status: DISCONTINUED | OUTPATIENT
Start: 2025-02-18 | End: 2025-02-18

## 2025-02-18 RX ADMIN — IOPAMIDOL 140 ML: 612 INJECTION, SOLUTION INTRAVASCULAR at 13:31:00

## 2025-02-18 NOTE — IVS NOTE
Hand-Off   Procedure hand off report given to Marimar ESPINOZA.   Pt's vital signs are stable.   Pt denies any pain or discomfort  Activity restriction and bedrest status reviewed    Pt is able to sit up and ambulate without difficulty.   Pt voided and tolerated fluids and food.   Procedural site remains dry and intact with good circulation, motion, and sensation.   No signs and symptoms of bleeding/hematoma noted.   Instruction provided, patient/family verbalizes understanding.   Dr. Negro spoke with patient/family post procedure.      Follow up Appointment: 3/5/25 with Dr. Villarreal; information reviewed with pt and family at discharge teaching.    Cardiac Rehab and Dietician spoke with patient.

## 2025-02-18 NOTE — DIETARY NOTE
NUTRITION EDUCATION NOTE     Received consult for cardiac nutrition education. Verbally reviewed basic cardiac diet restrictions. Provided with Eating Heart-Healthy handout to reinforce. Receptive to instruction. May benefit from outpt f/u. Expect fair compliance. RD contact information provided to pt.       Deandra Lara, MS, RDN, LDN  Clinical Dietitian

## 2025-02-18 NOTE — DISCHARGE INSTRUCTIONS
HOME CARE INSTRUCTIONS FOLLOWING CORONARY ANGIOGRAPHY, ANGIOPLASTY (PTCA/PTA) OR INSERTION OF STENT IN THE CORONARY      Activity  DO NOT drive after the procedure.  You may resume driving late the following day according to the nurse or physician's instructions  Avoid drinking alcohol for the next 24 hours  If the groin site was used, avoid repeated stair use and excessive walking for the next 24 hours  Do not lift anything over 10 pounds for 1 week  Plan on resting and relaxing tonight and tomorrow  Resume your normal activity after 48 hours, or as instructed by your physician    What is Normal?  A small lump at the procedure site associated with mild tenderness when touched  The procedure site may be bruised or discolored  There may be a small amount of drainage on the bandage    Special Instructions  The bandage is to remain in place for 24 hours; Keep the bandage clean and dry; Do NOT shower for 24 hours  After 24 hours, you must remove the bandage  You should shower after removing the bandage, and wash the procedure site gently with soap and water  DO NOT submerge the procedure site for 1 week (no bath tubs or pools)  Drink plenty of fluids during the next 24 hours to \"flush\" the contrast from your system  Do NOT take meformin/glucophage containing medications for 48 hours    When you should NOTIFY YOUR PHYSICIAN  Call right away if you experience any of the following:    Swelling, pain, or bleeding at the site that is not relieved by applying ice or pressure  Signs of infection: redness, warmth, drainage at the site, chills, or temperature of 100.5 degrees or greater.  Changes in sensation, numbness, or tingling of affected leg.    May call answering service at [phone] for any problems or concerns    Bleeding can occur at the procedure site - both on the outside of the skin and/or beneath the surface of the skin  Swelling or a large lump at the procedure site can occur, which may be accompanied by  moderate to severe pain    If either of the above occurs, lie down flat.  Have someone apply pressure to the procedure site with both hands, as instructed by the nurse.  Hold pressure for 20 minutes and the bleeding should stop.  Notify your physician of the occurrence  If the bleeding does not stop, call 911 and continue to apply pressure    If you experience signs of a fever, temperature > 101°, chills, infection (redness, swelling, thick yellow drainage, or a foul odor from the procedure site)  If you notice any numbness, tingling, or loss of feeling to your leg or foot or groin access    If You Received a Stent:    You will remain on an antiplatelet drug and/or aspirin.  Antiplatelet medications are usually taken for six months to one year and should not be stopped unless your cardiologist directs you to do so.  These medications help to prevent blockage at the stent site.  If another physician or dentist asks you to stop your antiplatelet medication, you need to consult your cardiologist first.  Together, your cardiologist and other physician can discuss the risks that may be involved if you are not taking the antiplatelet medication   If an MRI is necessary, it may be done 4-6 weeks after your procedure.  Verify this with your cardiologist  Keep your stent card with you at all times!  If you need an MRI in the future, your stent card will need to be shown to the technologist before performing the MRI.  A duplicate card CANNOT be reproduced.    Closure device utilized  type of closure used: Perclose  Additional Instructions:  Leave the dressing/band-aid over the site for 24 hours.   You may feel a \"pea or olive pit\" sized lumpand/or note mild tenderness in the groin area for approximately 5-7 days.         The collagen will be resorbed (broken down) by your body in approximately 6-12 weeks.     See appropriate pamphlet information   Other  You may resume your present diet, unless otherwise specified by your  physician.  You may resume all of your medications as prescribed, unless otherwise directed by your physician.  A list of your medications was provided to you at discharge.  Continue the walking program initiated in the hospital and progress your walking as directed.  Or, gradually resume your previous aerobic exercise schedule as tolerated.  Please call your physician’s office for a follow-up appointment.  You should be seen in 7-10 days.

## 2025-02-18 NOTE — INTERVAL H&P NOTE
Pre-op Diagnosis: * No pre-op diagnosis entered *    The above referenced H&P was reviewed by Boy Negro MD on 2/18/2025, the patient was examined and no significant changes have occurred in the patient's condition since the H&P was performed.  I discussed with the patient and/or legal representative the potential benefits, risks and side effects of this procedure; the likelihood of the patient achieving goals; and potential problems that might occur during recuperation.  I discussed reasonable alternatives to the procedure, including risks, benefits and side effects related to the alternatives and risks related to not receiving this procedure.  We will proceed with procedure as planned.

## 2025-02-19 NOTE — PROCEDURES
[facility]  part of [facility]    Cardiac Cath Procedure Note    Mike Buck Patient Status:  Outpatient    1950 MRN [id number]   Location [facility] INTERVENTIONAL SUITES Attending Mike Villarreal MD   Hosp Day # 0 PCP Sanjiv Sams MD       Cardiologist: Boy Negro MD  Primary Proceduralist: Boy Negro MD  Procedure Performed: LHC, COR, LV, GRAFT, and PCI SVG to ramus  Date of Procedure: 2025   Indication: Abnormal stress test    Summary of procedure:    Successful PCI SVG to ramus  Patent VALENTE to PDA, SVG to PL, LIMA to LAD.  Patent left main to inferolateral OM of the circumflex      Recommendations:  Antiplatelet: ASA and Plavix  Statin: On high dose dose statin, no changes  Discharge in 4 hours  Cardiac rehab as outpatient 1 to 2 weeks      Left Ventriculography and hemodynamics:   LV EF not done  LV EDP 12 mmHg  No gradient across aortic valve      Coronary Angiography  RCA: 100% occluded at the ostium    Left main:  Free of obstructive disease    Left anterior descendin% occluded at the ostium    Ramus: Possible proximal Faintly fills otherwise 100% occluded in the proximal segment    Circumflex:  Free of obstructive disease, supplies OM to the inferolateral wall which is nonobstructive      Grafts:  SVG to PDA nonobstructive, supplies epicardial collateral to diagonal  SVG to ramus with sequential 95% stenoses that do not extend to the distal anastomosis  VALENTE to PL nonobstructive however faintly fills, unable to selectively engage  LIMA to LAD nonobstructive with nonobstructive native vessel disease      SVG to ramus intervention  Lesion Characteristics-not torturous, not calcified.  Type non-C lesion.  Pre-intervention stenosis 95%, Post intervention stenosis 0%.  Pre ELVIA 3, Post ELVIA 3.      Guide Catheter: AL-1  Wire: Farzana pena  Pre-dil: 3 x 30 mm NC balloon to deliver guide extension  Stent: 3.5 x 30 mm [place] ROSE  Post-dil: None  Stent placed  just proximal to the distal anastomosis, appears to have retrograde course if  native ramus required        Description of Procedure:   After written informed consent was obtained from the patient, patient was brought to the cardiac catheterization laboratory.  Patient was prepped and draped in the usual sterile fashion. Lidocaine 1% was used to infiltrate the right groin for local anesthesia and a 6 Ethiopian introducer sheath was inserted into the right femoral artery via ultrasound guidance and micropuncture kit.      Selective coronary angiography performed with JR4 catheter for RCA and JL4 catheter for LCA.  Angiography performed in standard projections.      Multipurpose catheters for vein grafts, IM catheter for VALENTE and LIMA.    6 Kiswahili JR4 catheter placed in LV for hemodynamics.    Selective right femoral angiogram done assess anatomy for closure.      Specimen sent to: No specimen collected  Estimated blood loss: 10 cc  Closure:  Perclose       IV was maintained by RN and moderate conscious sedation of versed and fentanyl was given.  Patient was assessed and monitoring of oxygen, heart rate and blood pressure by nurse and myself during the exam 35 minutes.      Boy Negro MD  02/18/25

## 2025-02-19 NOTE — IVS NOTE
DISCHARGE NOTE     Pt is able to sit up and ambulate without difficulty.   Pt voided and tolerated fluids and food.   Procedural site remains dry and intact with good circulation, motion, and sensation.   No signs and symptoms of bleeding/hematoma noted.   IV access removed  Instruction reinforced to patient/family verbalizes understanding.   Dr. Negro spoke with patient/family post procedure.     Pt discharge via wheelchair to [facility]       New Prescription: None

## 2025-03-07 ENCOUNTER — ORDER TRANSCRIPTION (OUTPATIENT)
Dept: CARDIAC REHAB | Facility: HOSPITAL | Age: 75
End: 2025-03-07

## 2025-03-07 DIAGNOSIS — Z95.820 S/P ANGIOPLASTY WITH STENT: Primary | ICD-10-CM

## 2025-03-25 ENCOUNTER — APPOINTMENT (OUTPATIENT)
Dept: CARDIAC REHAB | Facility: HOSPITAL | Age: 75
End: 2025-03-25
Attending: STUDENT IN AN ORGANIZED HEALTH CARE EDUCATION/TRAINING PROGRAM
Payer: MEDICARE

## 2025-04-07 ENCOUNTER — CARDPULM VISIT (OUTPATIENT)
Dept: CARDIAC REHAB | Facility: HOSPITAL | Age: 75
End: 2025-04-07
Attending: STUDENT IN AN ORGANIZED HEALTH CARE EDUCATION/TRAINING PROGRAM
Payer: MEDICARE

## 2025-04-07 DIAGNOSIS — Z95.820 S/P ANGIOPLASTY WITH STENT: Primary | ICD-10-CM

## 2025-04-16 ENCOUNTER — APPOINTMENT (OUTPATIENT)
Dept: CARDIAC REHAB | Facility: HOSPITAL | Age: 75
End: 2025-04-16
Attending: STUDENT IN AN ORGANIZED HEALTH CARE EDUCATION/TRAINING PROGRAM
Payer: MEDICARE

## 2025-04-18 ENCOUNTER — APPOINTMENT (OUTPATIENT)
Dept: CARDIAC REHAB | Facility: HOSPITAL | Age: 75
End: 2025-04-18
Attending: STUDENT IN AN ORGANIZED HEALTH CARE EDUCATION/TRAINING PROGRAM
Payer: MEDICARE

## 2025-04-21 ENCOUNTER — APPOINTMENT (OUTPATIENT)
Dept: CARDIAC REHAB | Facility: HOSPITAL | Age: 75
End: 2025-04-21
Attending: STUDENT IN AN ORGANIZED HEALTH CARE EDUCATION/TRAINING PROGRAM
Payer: MEDICARE

## 2025-04-23 ENCOUNTER — CARDPULM VISIT (OUTPATIENT)
Dept: CARDIAC REHAB | Facility: HOSPITAL | Age: 75
End: 2025-04-23
Attending: STUDENT IN AN ORGANIZED HEALTH CARE EDUCATION/TRAINING PROGRAM
Payer: MEDICARE

## 2025-04-23 PROCEDURE — 93798 PHYS/QHP OP CAR RHAB W/ECG: CPT

## 2025-04-25 ENCOUNTER — CARDPULM VISIT (OUTPATIENT)
Dept: CARDIAC REHAB | Facility: HOSPITAL | Age: 75
End: 2025-04-25
Attending: STUDENT IN AN ORGANIZED HEALTH CARE EDUCATION/TRAINING PROGRAM
Payer: MEDICARE

## 2025-04-25 PROCEDURE — 93798 PHYS/QHP OP CAR RHAB W/ECG: CPT

## 2025-04-28 ENCOUNTER — CARDPULM VISIT (OUTPATIENT)
Dept: CARDIAC REHAB | Facility: HOSPITAL | Age: 75
End: 2025-04-28
Attending: STUDENT IN AN ORGANIZED HEALTH CARE EDUCATION/TRAINING PROGRAM
Payer: MEDICARE

## 2025-04-28 PROCEDURE — 93798 PHYS/QHP OP CAR RHAB W/ECG: CPT

## 2025-04-30 ENCOUNTER — CARDPULM VISIT (OUTPATIENT)
Dept: CARDIAC REHAB | Facility: HOSPITAL | Age: 75
End: 2025-04-30
Attending: STUDENT IN AN ORGANIZED HEALTH CARE EDUCATION/TRAINING PROGRAM
Payer: MEDICARE

## 2025-04-30 PROCEDURE — 93798 PHYS/QHP OP CAR RHAB W/ECG: CPT

## 2025-05-02 ENCOUNTER — CARDPULM VISIT (OUTPATIENT)
Dept: CARDIAC REHAB | Facility: HOSPITAL | Age: 75
End: 2025-05-02
Attending: STUDENT IN AN ORGANIZED HEALTH CARE EDUCATION/TRAINING PROGRAM
Payer: MEDICARE

## 2025-05-02 PROCEDURE — 93798 PHYS/QHP OP CAR RHAB W/ECG: CPT

## 2025-05-05 ENCOUNTER — CARDPULM VISIT (OUTPATIENT)
Dept: CARDIAC REHAB | Facility: HOSPITAL | Age: 75
End: 2025-05-05
Attending: STUDENT IN AN ORGANIZED HEALTH CARE EDUCATION/TRAINING PROGRAM
Payer: MEDICARE

## 2025-05-05 PROCEDURE — 93798 PHYS/QHP OP CAR RHAB W/ECG: CPT

## 2025-05-07 ENCOUNTER — CARDPULM VISIT (OUTPATIENT)
Dept: CARDIAC REHAB | Facility: HOSPITAL | Age: 75
End: 2025-05-07
Attending: STUDENT IN AN ORGANIZED HEALTH CARE EDUCATION/TRAINING PROGRAM
Payer: MEDICARE

## 2025-05-07 PROCEDURE — 93798 PHYS/QHP OP CAR RHAB W/ECG: CPT

## 2025-05-09 ENCOUNTER — CARDPULM VISIT (OUTPATIENT)
Dept: CARDIAC REHAB | Facility: HOSPITAL | Age: 75
End: 2025-05-09
Attending: STUDENT IN AN ORGANIZED HEALTH CARE EDUCATION/TRAINING PROGRAM
Payer: MEDICARE

## 2025-05-09 PROCEDURE — 93798 PHYS/QHP OP CAR RHAB W/ECG: CPT

## 2025-05-12 ENCOUNTER — CARDPULM VISIT (OUTPATIENT)
Dept: CARDIAC REHAB | Facility: HOSPITAL | Age: 75
End: 2025-05-12
Attending: STUDENT IN AN ORGANIZED HEALTH CARE EDUCATION/TRAINING PROGRAM
Payer: MEDICARE

## 2025-05-12 PROCEDURE — 93798 PHYS/QHP OP CAR RHAB W/ECG: CPT

## 2025-05-14 ENCOUNTER — CARDPULM VISIT (OUTPATIENT)
Dept: CARDIAC REHAB | Facility: HOSPITAL | Age: 75
End: 2025-05-14
Attending: STUDENT IN AN ORGANIZED HEALTH CARE EDUCATION/TRAINING PROGRAM
Payer: MEDICARE

## 2025-05-14 PROCEDURE — 93798 PHYS/QHP OP CAR RHAB W/ECG: CPT

## 2025-05-16 ENCOUNTER — CARDPULM VISIT (OUTPATIENT)
Dept: CARDIAC REHAB | Facility: HOSPITAL | Age: 75
End: 2025-05-16
Attending: STUDENT IN AN ORGANIZED HEALTH CARE EDUCATION/TRAINING PROGRAM
Payer: MEDICARE

## 2025-05-16 PROCEDURE — 93798 PHYS/QHP OP CAR RHAB W/ECG: CPT

## 2025-05-19 ENCOUNTER — CARDPULM VISIT (OUTPATIENT)
Dept: CARDIAC REHAB | Facility: HOSPITAL | Age: 75
End: 2025-05-19
Attending: STUDENT IN AN ORGANIZED HEALTH CARE EDUCATION/TRAINING PROGRAM
Payer: MEDICARE

## 2025-05-19 PROCEDURE — 93798 PHYS/QHP OP CAR RHAB W/ECG: CPT

## 2025-05-21 ENCOUNTER — CARDPULM VISIT (OUTPATIENT)
Dept: CARDIAC REHAB | Facility: HOSPITAL | Age: 75
End: 2025-05-21
Attending: STUDENT IN AN ORGANIZED HEALTH CARE EDUCATION/TRAINING PROGRAM
Payer: MEDICARE

## 2025-05-21 PROCEDURE — 93798 PHYS/QHP OP CAR RHAB W/ECG: CPT

## 2025-05-23 ENCOUNTER — CARDPULM VISIT (OUTPATIENT)
Dept: CARDIAC REHAB | Facility: HOSPITAL | Age: 75
End: 2025-05-23
Attending: STUDENT IN AN ORGANIZED HEALTH CARE EDUCATION/TRAINING PROGRAM
Payer: MEDICARE

## 2025-05-23 PROCEDURE — 93798 PHYS/QHP OP CAR RHAB W/ECG: CPT

## 2025-05-28 ENCOUNTER — CARDPULM VISIT (OUTPATIENT)
Dept: CARDIAC REHAB | Facility: HOSPITAL | Age: 75
End: 2025-05-28
Attending: STUDENT IN AN ORGANIZED HEALTH CARE EDUCATION/TRAINING PROGRAM
Payer: MEDICARE

## 2025-05-28 PROCEDURE — 93798 PHYS/QHP OP CAR RHAB W/ECG: CPT

## 2025-05-30 ENCOUNTER — CARDPULM VISIT (OUTPATIENT)
Dept: CARDIAC REHAB | Facility: HOSPITAL | Age: 75
End: 2025-05-30
Attending: STUDENT IN AN ORGANIZED HEALTH CARE EDUCATION/TRAINING PROGRAM
Payer: MEDICARE

## 2025-05-30 PROCEDURE — 93798 PHYS/QHP OP CAR RHAB W/ECG: CPT

## 2025-06-02 ENCOUNTER — CARDPULM VISIT (OUTPATIENT)
Dept: CARDIAC REHAB | Facility: HOSPITAL | Age: 75
End: 2025-06-02
Attending: STUDENT IN AN ORGANIZED HEALTH CARE EDUCATION/TRAINING PROGRAM
Payer: MEDICARE

## 2025-06-02 PROCEDURE — 93798 PHYS/QHP OP CAR RHAB W/ECG: CPT

## 2025-06-04 ENCOUNTER — CARDPULM VISIT (OUTPATIENT)
Dept: CARDIAC REHAB | Facility: HOSPITAL | Age: 75
End: 2025-06-04
Attending: STUDENT IN AN ORGANIZED HEALTH CARE EDUCATION/TRAINING PROGRAM
Payer: MEDICARE

## 2025-06-04 PROCEDURE — 93798 PHYS/QHP OP CAR RHAB W/ECG: CPT

## 2025-06-06 ENCOUNTER — CARDPULM VISIT (OUTPATIENT)
Dept: CARDIAC REHAB | Facility: HOSPITAL | Age: 75
End: 2025-06-06
Attending: STUDENT IN AN ORGANIZED HEALTH CARE EDUCATION/TRAINING PROGRAM
Payer: MEDICARE

## 2025-06-06 PROCEDURE — 93798 PHYS/QHP OP CAR RHAB W/ECG: CPT

## 2025-06-09 ENCOUNTER — CARDPULM VISIT (OUTPATIENT)
Dept: CARDIAC REHAB | Facility: HOSPITAL | Age: 75
End: 2025-06-09
Attending: STUDENT IN AN ORGANIZED HEALTH CARE EDUCATION/TRAINING PROGRAM
Payer: MEDICARE

## 2025-06-09 PROCEDURE — 93798 PHYS/QHP OP CAR RHAB W/ECG: CPT

## 2025-06-11 ENCOUNTER — CARDPULM VISIT (OUTPATIENT)
Dept: CARDIAC REHAB | Facility: HOSPITAL | Age: 75
End: 2025-06-11
Attending: STUDENT IN AN ORGANIZED HEALTH CARE EDUCATION/TRAINING PROGRAM
Payer: MEDICARE

## 2025-06-11 PROCEDURE — 93798 PHYS/QHP OP CAR RHAB W/ECG: CPT

## 2025-06-13 ENCOUNTER — CARDPULM VISIT (OUTPATIENT)
Dept: CARDIAC REHAB | Facility: HOSPITAL | Age: 75
End: 2025-06-13
Attending: STUDENT IN AN ORGANIZED HEALTH CARE EDUCATION/TRAINING PROGRAM
Payer: MEDICARE

## 2025-06-13 PROCEDURE — 93798 PHYS/QHP OP CAR RHAB W/ECG: CPT

## 2025-06-16 ENCOUNTER — CARDPULM VISIT (OUTPATIENT)
Dept: CARDIAC REHAB | Facility: HOSPITAL | Age: 75
End: 2025-06-16
Attending: STUDENT IN AN ORGANIZED HEALTH CARE EDUCATION/TRAINING PROGRAM
Payer: MEDICARE

## 2025-06-16 PROCEDURE — 93798 PHYS/QHP OP CAR RHAB W/ECG: CPT

## 2025-06-18 ENCOUNTER — CARDPULM VISIT (OUTPATIENT)
Dept: CARDIAC REHAB | Facility: HOSPITAL | Age: 75
End: 2025-06-18
Attending: STUDENT IN AN ORGANIZED HEALTH CARE EDUCATION/TRAINING PROGRAM
Payer: MEDICARE

## 2025-06-18 PROCEDURE — 93798 PHYS/QHP OP CAR RHAB W/ECG: CPT

## 2025-06-20 ENCOUNTER — APPOINTMENT (OUTPATIENT)
Dept: CARDIAC REHAB | Facility: HOSPITAL | Age: 75
End: 2025-06-20
Attending: STUDENT IN AN ORGANIZED HEALTH CARE EDUCATION/TRAINING PROGRAM
Payer: MEDICARE

## 2025-06-27 ENCOUNTER — CARDPULM VISIT (OUTPATIENT)
Dept: CARDIAC REHAB | Facility: HOSPITAL | Age: 75
End: 2025-06-27
Attending: STUDENT IN AN ORGANIZED HEALTH CARE EDUCATION/TRAINING PROGRAM
Payer: MEDICARE

## 2025-06-27 PROCEDURE — 93798 PHYS/QHP OP CAR RHAB W/ECG: CPT

## 2025-06-30 ENCOUNTER — CARDPULM VISIT (OUTPATIENT)
Dept: CARDIAC REHAB | Facility: HOSPITAL | Age: 75
End: 2025-06-30
Attending: STUDENT IN AN ORGANIZED HEALTH CARE EDUCATION/TRAINING PROGRAM
Payer: MEDICARE

## 2025-06-30 PROCEDURE — 93798 PHYS/QHP OP CAR RHAB W/ECG: CPT

## 2025-07-07 ENCOUNTER — CARDPULM VISIT (OUTPATIENT)
Dept: CARDIAC REHAB | Facility: HOSPITAL | Age: 75
End: 2025-07-07
Attending: STUDENT IN AN ORGANIZED HEALTH CARE EDUCATION/TRAINING PROGRAM
Payer: MEDICARE

## 2025-07-07 PROCEDURE — 93798 PHYS/QHP OP CAR RHAB W/ECG: CPT

## 2025-07-09 ENCOUNTER — CARDPULM VISIT (OUTPATIENT)
Dept: CARDIAC REHAB | Facility: HOSPITAL | Age: 75
End: 2025-07-09
Attending: STUDENT IN AN ORGANIZED HEALTH CARE EDUCATION/TRAINING PROGRAM
Payer: MEDICARE

## 2025-07-09 PROCEDURE — 93798 PHYS/QHP OP CAR RHAB W/ECG: CPT

## 2025-07-11 ENCOUNTER — CARDPULM VISIT (OUTPATIENT)
Dept: CARDIAC REHAB | Facility: HOSPITAL | Age: 75
End: 2025-07-11
Attending: STUDENT IN AN ORGANIZED HEALTH CARE EDUCATION/TRAINING PROGRAM
Payer: MEDICARE

## 2025-07-11 PROCEDURE — 93798 PHYS/QHP OP CAR RHAB W/ECG: CPT

## 2025-07-14 ENCOUNTER — CARDPULM VISIT (OUTPATIENT)
Dept: CARDIAC REHAB | Facility: HOSPITAL | Age: 75
End: 2025-07-14
Attending: STUDENT IN AN ORGANIZED HEALTH CARE EDUCATION/TRAINING PROGRAM
Payer: MEDICARE

## 2025-07-14 PROCEDURE — 93798 PHYS/QHP OP CAR RHAB W/ECG: CPT

## 2025-07-16 ENCOUNTER — CARDPULM VISIT (OUTPATIENT)
Dept: CARDIAC REHAB | Facility: HOSPITAL | Age: 75
End: 2025-07-16
Attending: STUDENT IN AN ORGANIZED HEALTH CARE EDUCATION/TRAINING PROGRAM
Payer: MEDICARE

## 2025-07-16 PROCEDURE — 93798 PHYS/QHP OP CAR RHAB W/ECG: CPT

## 2025-07-18 ENCOUNTER — CARDPULM VISIT (OUTPATIENT)
Dept: CARDIAC REHAB | Facility: HOSPITAL | Age: 75
End: 2025-07-18
Attending: STUDENT IN AN ORGANIZED HEALTH CARE EDUCATION/TRAINING PROGRAM
Payer: MEDICARE

## 2025-07-18 PROCEDURE — 93798 PHYS/QHP OP CAR RHAB W/ECG: CPT

## 2025-07-21 ENCOUNTER — CARDPULM VISIT (OUTPATIENT)
Dept: CARDIAC REHAB | Facility: HOSPITAL | Age: 75
End: 2025-07-21
Attending: STUDENT IN AN ORGANIZED HEALTH CARE EDUCATION/TRAINING PROGRAM
Payer: MEDICARE

## 2025-07-21 PROCEDURE — 93798 PHYS/QHP OP CAR RHAB W/ECG: CPT

## 2025-07-23 ENCOUNTER — CARDPULM VISIT (OUTPATIENT)
Dept: CARDIAC REHAB | Facility: HOSPITAL | Age: 75
End: 2025-07-23
Attending: STUDENT IN AN ORGANIZED HEALTH CARE EDUCATION/TRAINING PROGRAM
Payer: MEDICARE

## 2025-07-23 PROCEDURE — 93798 PHYS/QHP OP CAR RHAB W/ECG: CPT

## 2025-07-25 ENCOUNTER — CARDPULM VISIT (OUTPATIENT)
Dept: CARDIAC REHAB | Facility: HOSPITAL | Age: 75
End: 2025-07-25
Attending: STUDENT IN AN ORGANIZED HEALTH CARE EDUCATION/TRAINING PROGRAM
Payer: MEDICARE

## 2025-07-25 PROCEDURE — 93798 PHYS/QHP OP CAR RHAB W/ECG: CPT

## 2025-07-28 ENCOUNTER — CARDPULM VISIT (OUTPATIENT)
Dept: CARDIAC REHAB | Facility: HOSPITAL | Age: 75
End: 2025-07-28
Attending: STUDENT IN AN ORGANIZED HEALTH CARE EDUCATION/TRAINING PROGRAM
Payer: MEDICARE

## 2025-07-28 PROCEDURE — 93798 PHYS/QHP OP CAR RHAB W/ECG: CPT

## (undated) DEVICE — TROCAR: Brand: KII FIOS FIRST ENTRY

## (undated) DEVICE — ARM DRAPE

## (undated) DEVICE — SUTURE VCRL SZ 0 L54IN ABSRB UD POLYGLACTIN

## (undated) DEVICE — PAD POS 36IN DISP SURGYPAD

## (undated) DEVICE — BLADELESS OBTURATOR: Brand: WECK VISTA

## (undated) DEVICE — SURGICEL FIBULAR 2X4

## (undated) DEVICE — SUT MCRYL 4-0 18IN PS-2 ABSRB UD 19MM 3/8 CIR

## (undated) DEVICE — ROBOTIC: Brand: MEDLINE INDUSTRIES, INC.

## (undated) DEVICE — ABSORBABLE WOUND CLOSURE DEVICE: Brand: V-LOC 90

## (undated) DEVICE — SOLUTION IRRIG 1000ML 0.9% NACL USP BTL

## (undated) DEVICE — SUCTION CANISTER, 3000CC,SAFELINER: Brand: DEROYAL

## (undated) DEVICE — COLUMN DRAPE

## (undated) DEVICE — ADHESIVE SKIN TOP FOR WND CLSR DERMBND ADV

## (undated) DEVICE — SUT V-LOC 90 3-0 9IN CV-23 ABSRB VLT 17MM 1/2

## (undated) DEVICE — TIP COVER ACCESSORY

## (undated) DEVICE — LAPAROVUE VISIBILITY SYSTEM LAPAROSCOPIC SOLUTIONS: Brand: LAPAROVUE

## (undated) DEVICE — PROGRASP FORCEPS: Brand: ENDOWRIST

## (undated) DEVICE — SUT PDS II 0 36IN CT-1 ABSRB VLT L36MM 1/2

## (undated) DEVICE — CLIP INT L POLYMER LOK LIG HEM O LOK

## (undated) DEVICE — CATHETER URETH 16FR BLLN 5CC SIL HYDRGEL 2 W

## (undated) DEVICE — CANNULA SEAL

## (undated) DEVICE — SUTURE VCRL 0 L27IN ABSRB VLT L26MM UR-6 5/8

## (undated) DEVICE — ELECTRO LUBE IS A SINGLE PATIENT USE DEVICE THAT IS INTENDED TO BE USED ON ELECTROSURGICAL ELECTRODES TO REDUCE STICKING.: Brand: KEY SURGICAL ELECTRO LUBE

## (undated) DEVICE — SUT COAT VCRL 4-0 27IN RB-1 ABSRB UD 17MM 1/2

## (undated) DEVICE — ANCHOR TISSUE RETRIEVAL SYSTEM, BAG SIZE 175 ML, PORT SIZE 10 MM: Brand: ANCHOR TISSUE RETRIEVAL SYSTEM

## (undated) DEVICE — BIPOLAR GRASPER, LONG: Brand: ENDOWRIST

## (undated) DEVICE — SURGIFLO ENDOSCOPIC APPICATOR: Brand: ETHICON

## (undated) DEVICE — 20 ML SYRINGE LUER-LOCK TIP: Brand: MONOJECT

## (undated) DEVICE — TUBING MEGADYNE LAPAROSCOPIC

## (undated) DEVICE — LARGE NEEDLE DRIVER: Brand: ENDOWRIST

## (undated) DEVICE — MONOPOLAR CURVED SCISSORS: Brand: ENDOWRIST

## (undated) DEVICE — INSUFFLATION NEEDLE TO ESTABLISH PNEUMOPERITONEUM.: Brand: INSUFFLATION NEEDLE

## (undated) DEVICE — JELLY,LUBE,STERILE,FLIP TOP,TUBE,2-OZ: Brand: MEDLINE

## (undated) DEVICE — SUT COAT VCRL 3-0 27IN SH ABSRB UD 26MM 1/2

## (undated) DEVICE — SOLUTION IV 1000ML DIL ST H2O

## (undated) DEVICE — SUT COAT VCRL 0 27IN CT-1 ABSRB VLT 36MM 1/2

## (undated) DEVICE — ENCORE® LATEX MICRO SIZE 6.5, STERILE LATEX POWDER-FREE SURGICAL GLOVE: Brand: ENCORE

## (undated) DEVICE — KIT HEMSTAT MTRX 8ML PORCINE GEL HUM THROM

## (undated) DEVICE — DRAPE LAP 100IN X 76IN X 120IN SMS FAB CHOLE

## (undated) DEVICE — COVER MAYOSTAND 23X54IN NWVN FAB

## (undated) DEVICE — SLEEVE PROTCT SUR 1 SZ ST SMS EVOL 4

## (undated) DEVICE — GAMMEX® PI HYBRID SIZE 7.5, STERILE POWDER-FREE SURGICAL GLOVE, POLYISOPRENE AND NEOPRENE BLEND: Brand: GAMMEX

## (undated) NOTE — IP AVS SNAPSHOT
[address]  [address] [facility] [phone]                Discharge Summary   6/22/2017    [facility]           Admission Information        Provider Department    6/22/2017 Scott Shanks MD [facility] Inte [    ]       Metoprolol Succinate ER 50 MG Tb24   Commonly known as: Toprol XL        25 mg daily      [    ]    [    ]    [    ]    [    ]       NAPROXEN OR        Take by mouth as needed.       [    ]    [    ]    [    ]    [    ]       ramipril 10 MG C ? If you choose to wear a bandage for a few days, make sure it remains clean and dry and that it is changed daily    When you should NOTIFY YOUR PHYSICIAN  ?  Bleeding can occur at the procedure site – both on the outside of the skin and/or beneath the surf performing the MRI. A duplicate card CANNOT be reproduced. Other  ? You may resume your present diet, unless otherwise specified by your physician. ? You may resume all of your medications as prescribed, unless otherwise directed by your physician.   A ALT Bilirubin,Total Total Protein Albumin Sodium Potassium Chloride    -- -- -- -- (06/06/17)  139 (06/06/17)  5.10 (06/06/17)  104      Radiology Exams     None         Additional Information       We are concerned for your overall well being:    - If yo review your medications with you before you are discharged, and can provide you with additional printed information. Not all patients will experience these side effects or respond to medications the same.  Please call your provider or healthcare team if you Enlarged Prostate Medications     Prostatic Hypertrophy Agents    tamsulosin HCl 0.4 MG Oral Cap       Use: Improve urine flow that has become difficult because of an enlarged prostate   Most common side effects: Impotence, decreased libido, low blood p

## (undated) NOTE — LETTER
[facility], [place]  Authorization for Invasive Procedures  1.  I hereby authorize Dr. Crystal Simons , my physician and whomever may be designated as the doctor's assistant, to perform the following operation and/or procedure:  CARDIAC CAT 5. I consent to the photographing of the operations or procedures to be performed for the purposes of advancing medicine, science, and/or education, provided my identity is not revealed.  If the procedure has been videotaped, the physician/surgeon will obta __________ Time: ___________    Statement of Physician  My signature below affirms that prior to the time of the procedure, I have explained to the patient and/or his legal representative, the risks and benefits involved in the proposed treatment and any r